# Patient Record
Sex: FEMALE | Race: WHITE | Employment: UNEMPLOYED
[De-identification: names, ages, dates, MRNs, and addresses within clinical notes are randomized per-mention and may not be internally consistent; named-entity substitution may affect disease eponyms.]

---

## 2017-01-03 ENCOUNTER — OFFICE VISIT (OUTPATIENT)
Dept: NEUROSURGERY | Facility: CLINIC | Age: 62
End: 2017-01-03

## 2017-01-03 VITALS
WEIGHT: 155 LBS | HEART RATE: 101 BPM | SYSTOLIC BLOOD PRESSURE: 202 MMHG | HEIGHT: 63 IN | DIASTOLIC BLOOD PRESSURE: 117 MMHG | BODY MASS INDEX: 27.46 KG/M2

## 2017-01-03 DIAGNOSIS — G91.2 NORMAL PRESSURE HYDROCEPHALUS (HCC): Primary | ICD-10-CM

## 2017-01-03 PROCEDURE — 99024 POSTOP FOLLOW-UP VISIT: CPT | Performed by: NEUROLOGICAL SURGERY

## 2017-01-03 RX ORDER — OXYCODONE HYDROCHLORIDE AND ACETAMINOPHEN 5; 325 MG/1; MG/1
1 TABLET ORAL EVERY 8 HOURS PRN
Qty: 90 TABLET | Refills: 0 | Status: SHIPPED | OUTPATIENT
Start: 2017-01-20 | End: 2017-01-27

## 2018-07-02 ENCOUNTER — HOSPITAL ENCOUNTER (EMERGENCY)
Age: 63
Discharge: HOME | End: 2018-07-02
Attending: EMERGENCY MEDICINE
Payer: MEDICARE

## 2018-07-02 VITALS
SYSTOLIC BLOOD PRESSURE: 146 MMHG | BODY MASS INDEX: 21.79 KG/M2 | HEART RATE: 78 BPM | TEMPERATURE: 98.4 F | DIASTOLIC BLOOD PRESSURE: 74 MMHG | WEIGHT: 123 LBS | OXYGEN SATURATION: 98 % | RESPIRATION RATE: 20 BRPM

## 2018-07-02 DIAGNOSIS — L02.11 NECK ABSCESS: Primary | ICD-10-CM

## 2018-07-02 PROCEDURE — 86403 PARTICLE AGGLUT ANTBDY SCRN: CPT

## 2018-07-02 PROCEDURE — 87205 SMEAR GRAM STAIN: CPT

## 2018-07-02 PROCEDURE — 87070 CULTURE OTHR SPECIMN AEROBIC: CPT

## 2018-07-02 PROCEDURE — 6370000000 HC RX 637 (ALT 250 FOR IP): Performed by: EMERGENCY MEDICINE

## 2018-07-02 PROCEDURE — 87186 SC STD MICRODIL/AGAR DIL: CPT

## 2018-07-02 PROCEDURE — 99283 EMERGENCY DEPT VISIT LOW MDM: CPT

## 2018-07-02 RX ORDER — CLINDAMYCIN HYDROCHLORIDE 150 MG/1
300 CAPSULE ORAL ONCE
Status: COMPLETED | OUTPATIENT
Start: 2018-07-02 | End: 2018-07-02

## 2018-07-02 RX ORDER — CLINDAMYCIN HYDROCHLORIDE 300 MG/1
300 CAPSULE ORAL 3 TIMES DAILY
Qty: 30 CAPSULE | Refills: 0 | Status: SHIPPED | OUTPATIENT
Start: 2018-07-02 | End: 2018-07-12

## 2018-07-02 RX ADMIN — CLINDAMYCIN HYDROCHLORIDE 300 MG: 150 CAPSULE ORAL at 02:03

## 2018-07-02 NOTE — ED NOTES
patient to ED with a lump behind her right ear that family noticed on 6-29-18, and today they noticed the lump has started leaking     Lucy Guzman RN  07/02/18 4064

## 2018-07-02 NOTE — ED PROVIDER NOTES
fluctuance. Eyes:  PERRL, EOMI, Conjunctiva normal, No discharge. Respiratory:  Normal breath sounds, No respiratory distress, No wheezing, No chest tenderness. Cardiovascular:  Normal heart rate, Normal rhythm, No murmurs, No rubs, No gallops. GI:  Bowel sounds normal, Soft, No tenderness, No masses, No pulsatile masses. : External genitalia appear normal, No masses or lesions. No discharge. No CVA tenderness. Musculoskeletal:  Intact distal pulses, No edema, No tenderness, No cyanosis, No clubbing. Good range of motion in all major joints. No tenderness to palpation or major deformities noted. Back- No tenderness. Integument:  Warm, Dry, No erythema, No rash. Lymphatic:  No lymphadenopathy noted. Neurologic:  Alert & oriented x 3, Normal motor function, Normal sensory function, No focal deficits noted. Psychiatric:  Affect normal, Judgment normal, Mood normal.     EKG        RADIOLOGY    No orders to display       PROCEDURES        Labs  Labs Reviewed   WOUND CULTURE             Summation      Patient Course: Cultures obtained in ED. Patient is sent home on clindamycin. Surgical referral for incision and drainage is recommended. The warning signs were discussed. Warm compress daily is recommended. ED Medications administered this visit:    Medications   clindamycin (CLEOCIN) capsule 300 mg (300 mg Oral Given 7/2/18 0203)       New Prescriptions from this visit:    Discharge Medication List as of 7/2/2018  1:48 AM      START taking these medications    Details   clindamycin (CLEOCIN) 300 MG capsule Take 1 capsule by mouth 3 times daily for 10 days, Disp-30 capsule, R-0Print             Follow-up:  Anastasia Nunez MD  [facility]  [phone]    Schedule an appointment as soon as possible for a visit           Final Impression:   1.  Neck abscess               (Please note that portions of this note were completed with a voice recognition program. Efforts were made to edit the dictations but occasionally words are mis-transcribed.)        Mark Esquivel MD  07/02/18 0947

## 2018-07-04 LAB
CULTURE: ABNORMAL
DIRECT EXAM: ABNORMAL
DIRECT EXAM: ABNORMAL
Lab: ABNORMAL
ORGANISM: ABNORMAL
SPECIMEN DESCRIPTION: ABNORMAL
STATUS: ABNORMAL

## 2019-12-04 ENCOUNTER — APPOINTMENT (OUTPATIENT)
Dept: GENERAL RADIOLOGY | Age: 64
DRG: 536 | End: 2019-12-04
Payer: MEDICARE

## 2019-12-04 ENCOUNTER — HOSPITAL ENCOUNTER (INPATIENT)
Age: 64
LOS: 1 days | Discharge: ANOTHER HOSPITAL | DRG: 536 | End: 2019-12-05
Attending: EMERGENCY MEDICINE | Admitting: INTERNAL MEDICINE
Payer: MEDICARE

## 2019-12-04 DIAGNOSIS — S32.591A PUBIC RAMUS FRACTURE, RIGHT, CLOSED, INITIAL ENCOUNTER (HCC): Primary | ICD-10-CM

## 2019-12-04 DIAGNOSIS — S42.211A FX HUMERAL NECK, RIGHT, CLOSED, INITIAL ENCOUNTER: ICD-10-CM

## 2019-12-04 DIAGNOSIS — M25.551 RIGHT HIP PAIN: ICD-10-CM
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
## 2019-12-04 LAB
-: ABNORMAL
ABSOLUTE EOS #: ABNORMAL K/UL (ref 0–0.4)
ABSOLUTE IMMATURE GRANULOCYTE: ABNORMAL K/UL (ref 0–0.3)
ABSOLUTE LYMPH #: 0.77 K/UL (ref 1–4.8)
ABSOLUTE MONO #: 0.97 K/UL (ref 0–1)
ALBUMIN SERPL-MCNC: 4.2 G/DL (ref 3.5–5.2)
ALBUMIN/GLOBULIN RATIO: ABNORMAL (ref 1–2.5)
ALP BLD-CCNC: 127 U/L (ref 35–104)
ALT SERPL-CCNC: 14 U/L (ref 5–33)
AMORPHOUS: ABNORMAL
ANION GAP SERPL CALCULATED.3IONS-SCNC: 16 MMOL/L (ref 9–17)
AST SERPL-CCNC: 22 U/L
BACTERIA: ABNORMAL
BASOPHILS # BLD: ABNORMAL % (ref 0–2)
BASOPHILS ABSOLUTE: ABNORMAL K/UL (ref 0–0.2)
BILIRUB SERPL-MCNC: 0.76 MG/DL (ref 0.3–1.2)
BILIRUBIN URINE: NEGATIVE
BUN BLDV-MCNC: 15 MG/DL (ref 8–23)
BUN/CREAT BLD: 18 (ref 9–20)
CALCIUM SERPL-MCNC: 9.6 MG/DL (ref 8.6–10.4)
CASTS UA: ABNORMAL /LPF
CHLORIDE BLD-SCNC: 99 MMOL/L (ref 98–107)
CO2: 21 MMOL/L (ref 20–31)
COLOR: YELLOW
COMMENT UA: ABNORMAL
CREAT SERPL-MCNC: 0.85 MG/DL (ref 0.5–0.9)
CRYSTALS, UA: ABNORMAL /HPF
DIFFERENTIAL TYPE: ABNORMAL
EOSINOPHILS RELATIVE PERCENT: ABNORMAL % (ref 0–5)
EPITHELIAL CELLS UA: ABNORMAL /HPF
GFR AFRICAN AMERICAN: >60 ML/MIN
GFR NON-AFRICAN AMERICAN: >60 ML/MIN
GFR SERPL CREATININE-BSD FRML MDRD: ABNORMAL ML/MIN/{1.73_M2}
GFR SERPL CREATININE-BSD FRML MDRD: ABNORMAL ML/MIN/{1.73_M2}
GLUCOSE BLD-MCNC: 199 MG/DL (ref 70–99)
GLUCOSE URINE: NEGATIVE
HCT VFR BLD CALC: 41.5 % (ref 36–46)
HEMOGLOBIN: 13.8 G/DL (ref 12–16)
IMMATURE GRANULOCYTES: ABNORMAL %
INR BLD: 1.1
KETONES, URINE: NEGATIVE
LEUKOCYTE ESTERASE, URINE: ABNORMAL
LYMPHOCYTES # BLD: 4 % (ref 15–40)
MCH RBC QN AUTO: 29.3 PG (ref 26–34)
MCHC RBC AUTO-ENTMCNC: 33.2 G/DL (ref 31–37)
MCV RBC AUTO: 88.3 FL (ref 80–100)
MONOCYTES # BLD: 5 % (ref 4–8)
MORPHOLOGY: ABNORMAL
MUCUS: ABNORMAL
NITRITE, URINE: POSITIVE
NRBC AUTOMATED: ABNORMAL PER 100 WBC
OTHER OBSERVATIONS UA: ABNORMAL
PDW BLD-RTO: 13.1 % (ref 12.1–15.2)
PH UA: 6 (ref 5–8)
PLATELET # BLD: 233 K/UL (ref 140–450)
PLATELET ESTIMATE: ABNORMAL
PMV BLD AUTO: ABNORMAL FL (ref 6–12)
POTASSIUM SERPL-SCNC: 4.2 MMOL/L (ref 3.7–5.3)
PROTEIN UA: ABNORMAL
PROTHROMBIN TIME: 10.5 SEC (ref 9–11.6)
RBC # BLD: 4.7 M/UL (ref 4–5.2)
RBC # BLD: ABNORMAL 10*6/UL
RBC UA: ABNORMAL /HPF (ref 0–2)
RENAL EPITHELIAL, UA: ABNORMAL /HPF
SEG NEUTROPHILS: 91 % (ref 47–75)
SEGMENTED NEUTROPHILS ABSOLUTE COUNT: 17.56 K/UL (ref 2.5–7)
SODIUM BLD-SCNC: 136 MMOL/L (ref 135–144)
SPECIFIC GRAVITY UA: 1.01 (ref 1–1.03)
TOTAL PROTEIN: 8.6 G/DL (ref 6.4–8.3)
TRICHOMONAS: ABNORMAL
TROPONIN INTERP: NORMAL
TROPONIN T: <0.03 NG/ML
TROPONIN, HIGH SENSITIVITY: NORMAL NG/L (ref 0–14)
TURBIDITY: ABNORMAL
URINE HGB: ABNORMAL
UROBILINOGEN, URINE: NORMAL
WBC # BLD: 19.3 K/UL (ref 3.5–11)
WBC # BLD: ABNORMAL 10*3/UL
WBC UA: ABNORMAL /HPF
YEAST: ABNORMAL

## 2019-12-04 PROCEDURE — 84484 ASSAY OF TROPONIN QUANT: CPT

## 2019-12-04 PROCEDURE — 1200000000 HC SEMI PRIVATE

## 2019-12-04 PROCEDURE — 2580000003 HC RX 258: Performed by: INTERNAL MEDICINE

## 2019-12-04 PROCEDURE — 99285 EMERGENCY DEPT VISIT HI MDM: CPT

## 2019-12-04 PROCEDURE — 87186 SC STD MICRODIL/AGAR DIL: CPT

## 2019-12-04 PROCEDURE — 6370000000 HC RX 637 (ALT 250 FOR IP): Performed by: INTERNAL MEDICINE

## 2019-12-04 PROCEDURE — 73030 X-RAY EXAM OF SHOULDER: CPT

## 2019-12-04 PROCEDURE — 81001 URINALYSIS AUTO W/SCOPE: CPT

## 2019-12-04 PROCEDURE — 73502 X-RAY EXAM HIP UNI 2-3 VIEWS: CPT

## 2019-12-04 PROCEDURE — 96375 TX/PRO/DX INJ NEW DRUG ADDON: CPT

## 2019-12-04 PROCEDURE — 93005 ELECTROCARDIOGRAM TRACING: CPT | Performed by: EMERGENCY MEDICINE

## 2019-12-04 PROCEDURE — 6360000002 HC RX W HCPCS: Performed by: EMERGENCY MEDICINE

## 2019-12-04 PROCEDURE — 94761 N-INVAS EAR/PLS OXIMETRY MLT: CPT

## 2019-12-04 PROCEDURE — 96374 THER/PROPH/DIAG INJ IV PUSH: CPT

## 2019-12-04 PROCEDURE — 6360000002 HC RX W HCPCS: Performed by: INTERNAL MEDICINE

## 2019-12-04 PROCEDURE — 85610 PROTHROMBIN TIME: CPT

## 2019-12-04 PROCEDURE — 87088 URINE BACTERIA CULTURE: CPT

## 2019-12-04 PROCEDURE — 73564 X-RAY EXAM KNEE 4 OR MORE: CPT

## 2019-12-04 PROCEDURE — 80053 COMPREHEN METABOLIC PANEL: CPT

## 2019-12-04 PROCEDURE — 96376 TX/PRO/DX INJ SAME DRUG ADON: CPT

## 2019-12-04 PROCEDURE — 85025 COMPLETE CBC W/AUTO DIFF WBC: CPT

## 2019-12-04 PROCEDURE — 87086 URINE CULTURE/COLONY COUNT: CPT

## 2019-12-04 RX ORDER — ONDANSETRON 2 MG/ML
4 INJECTION INTRAMUSCULAR; INTRAVENOUS ONCE
Status: COMPLETED | OUTPATIENT
Start: 2019-12-04 | End: 2019-12-04

## 2019-12-04 RX ORDER — POLYETHYLENE GLYCOL 3350 17 G/17G
17 POWDER, FOR SOLUTION ORAL DAILY PRN
Status: DISCONTINUED | OUTPATIENT
Start: 2019-12-04 | End: 2019-12-06 | Stop reason: HOSPADM

## 2019-12-04 RX ORDER — MORPHINE SULFATE 2 MG/ML
2 INJECTION, SOLUTION INTRAMUSCULAR; INTRAVENOUS
Status: DISCONTINUED | OUTPATIENT
Start: 2019-12-04 | End: 2019-12-05

## 2019-12-04 RX ORDER — ONDANSETRON 2 MG/ML
4 INJECTION INTRAMUSCULAR; INTRAVENOUS EVERY 6 HOURS PRN
Status: DISCONTINUED | OUTPATIENT
Start: 2019-12-04 | End: 2019-12-06 | Stop reason: HOSPADM

## 2019-12-04 RX ORDER — TRAMADOL HYDROCHLORIDE 50 MG/1
50 TABLET ORAL EVERY 6 HOURS PRN
Status: DISCONTINUED | OUTPATIENT
Start: 2019-12-04 | End: 2019-12-06 | Stop reason: HOSPADM

## 2019-12-04 RX ORDER — SODIUM CHLORIDE 0.9 % (FLUSH) 0.9 %
10 SYRINGE (ML) INJECTION EVERY 12 HOURS SCHEDULED
Status: DISCONTINUED | OUTPATIENT
Start: 2019-12-04 | End: 2019-12-06 | Stop reason: HOSPADM

## 2019-12-04 RX ORDER — SODIUM CHLORIDE 0.9 % (FLUSH) 0.9 %
10 SYRINGE (ML) INJECTION PRN
Status: DISCONTINUED | OUTPATIENT
Start: 2019-12-04 | End: 2019-12-06 | Stop reason: HOSPADM

## 2019-12-04 RX ORDER — SODIUM CHLORIDE 9 MG/ML
INJECTION, SOLUTION INTRAVENOUS CONTINUOUS
Status: DISCONTINUED | OUTPATIENT
Start: 2019-12-04 | End: 2019-12-06 | Stop reason: HOSPADM

## 2019-12-04 RX ORDER — CIPROFLOXACIN 500 MG/1
500 TABLET, FILM COATED ORAL EVERY 12 HOURS SCHEDULED
Status: DISCONTINUED | OUTPATIENT
Start: 2019-12-04 | End: 2019-12-06 | Stop reason: HOSPADM

## 2019-12-04 RX ORDER — HYDRALAZINE HYDROCHLORIDE 20 MG/ML
10 INJECTION INTRAMUSCULAR; INTRAVENOUS EVERY 6 HOURS PRN
Status: DISCONTINUED | OUTPATIENT
Start: 2019-12-04 | End: 2019-12-06 | Stop reason: HOSPADM

## 2019-12-04 RX ORDER — FENTANYL CITRATE 50 UG/ML
50 INJECTION, SOLUTION INTRAMUSCULAR; INTRAVENOUS ONCE
Status: COMPLETED | OUTPATIENT
Start: 2019-12-04 | End: 2019-12-04

## 2019-12-04 RX ORDER — CLONIDINE HYDROCHLORIDE 0.1 MG/1
0.2 TABLET ORAL 3 TIMES DAILY
Status: DISCONTINUED | OUTPATIENT
Start: 2019-12-04 | End: 2019-12-05

## 2019-12-04 RX ORDER — SIMVASTATIN 20 MG
20 TABLET ORAL NIGHTLY
Status: DISCONTINUED | OUTPATIENT
Start: 2019-12-04 | End: 2019-12-06 | Stop reason: HOSPADM

## 2019-12-04 RX ORDER — LISINOPRIL 20 MG/1
40 TABLET ORAL DAILY
Status: DISCONTINUED | OUTPATIENT
Start: 2019-12-04 | End: 2019-12-06 | Stop reason: HOSPADM

## 2019-12-04 RX ORDER — ACETAMINOPHEN 325 MG/1
650 TABLET ORAL EVERY 4 HOURS PRN
Status: DISCONTINUED | OUTPATIENT
Start: 2019-12-04 | End: 2019-12-06 | Stop reason: HOSPADM

## 2019-12-04 RX ADMIN — Medication 10 ML: at 20:42

## 2019-12-04 RX ADMIN — FENTANYL CITRATE 50 MCG: 50 INJECTION INTRAMUSCULAR; INTRAVENOUS at 13:08

## 2019-12-04 RX ADMIN — FENTANYL CITRATE 50 MCG: 50 INJECTION INTRAMUSCULAR; INTRAVENOUS at 14:38

## 2019-12-04 RX ADMIN — LISINOPRIL 40 MG: 20 TABLET ORAL at 17:37

## 2019-12-04 RX ADMIN — SODIUM CHLORIDE: 9 INJECTION, SOLUTION INTRAVENOUS at 23:12

## 2019-12-04 RX ADMIN — CLONIDINE HYDROCHLORIDE 0.2 MG: 0.1 TABLET ORAL at 20:41

## 2019-12-04 RX ADMIN — SIMVASTATIN 20 MG: 20 TABLET, FILM COATED ORAL at 20:41

## 2019-12-04 RX ADMIN — CLONIDINE HYDROCHLORIDE 0.2 MG: 0.1 TABLET ORAL at 17:38

## 2019-12-04 RX ADMIN — ENOXAPARIN SODIUM 40 MG: 40 INJECTION SUBCUTANEOUS at 17:38

## 2019-12-04 RX ADMIN — TRAMADOL HYDROCHLORIDE 50 MG: 50 TABLET, FILM COATED ORAL at 19:05

## 2019-12-04 RX ADMIN — MORPHINE SULFATE 2 MG: 2 INJECTION, SOLUTION INTRAMUSCULAR; INTRAVENOUS at 21:15

## 2019-12-04 RX ADMIN — CIPROFLOXACIN HYDROCHLORIDE 500 MG: 500 TABLET, FILM COATED ORAL at 20:41

## 2019-12-04 RX ADMIN — ONDANSETRON 4 MG: 2 INJECTION, SOLUTION INTRAMUSCULAR; INTRAVENOUS at 13:26

## 2019-12-04 ASSESSMENT — PAIN SCALES - GENERAL
PAINLEVEL_OUTOF10: 0
PAINLEVEL_OUTOF10: 0
PAINLEVEL_OUTOF10: 8
PAINLEVEL_OUTOF10: 8
PAINLEVEL_OUTOF10: 10
PAINLEVEL_OUTOF10: 8
PAINLEVEL_OUTOF10: 9
PAINLEVEL_OUTOF10: 7
PAINLEVEL_OUTOF10: 0
PAINLEVEL_OUTOF10: 6
PAINLEVEL_OUTOF10: 7
PAINLEVEL_OUTOF10: 9

## 2019-12-04 ASSESSMENT — PAIN DESCRIPTION - PAIN TYPE
TYPE: ACUTE PAIN
TYPE: ACUTE PAIN

## 2019-12-04 ASSESSMENT — PAIN DESCRIPTION - DESCRIPTORS: DESCRIPTORS: ACHING

## 2019-12-04 ASSESSMENT — PAIN DESCRIPTION - LOCATION
LOCATION: HIP
LOCATION: KNEE;SHOULDER

## 2019-12-04 ASSESSMENT — PAIN DESCRIPTION - ORIENTATION
ORIENTATION: RIGHT
ORIENTATION: RIGHT

## 2019-12-04 ASSESSMENT — PAIN DESCRIPTION - PROGRESSION: CLINICAL_PROGRESSION: NOT CHANGED

## 2019-12-04 ASSESSMENT — PAIN DESCRIPTION - FREQUENCY
FREQUENCY: CONTINUOUS
FREQUENCY: INTERMITTENT

## 2019-12-04 ASSESSMENT — PAIN DESCRIPTION - ONSET: ONSET: ON-GOING

## 2019-12-05 VITALS
DIASTOLIC BLOOD PRESSURE: 60 MMHG | TEMPERATURE: 97.8 F | HEART RATE: 70 BPM | BODY MASS INDEX: 25.96 KG/M2 | SYSTOLIC BLOOD PRESSURE: 118 MMHG | OXYGEN SATURATION: 92 % | HEIGHT: 63 IN | RESPIRATION RATE: 18 BRPM | WEIGHT: 146.5 LBS

## 2019-12-05 LAB
ABSOLUTE EOS #: 0.1 K/UL (ref 0–0.4)
ABSOLUTE IMMATURE GRANULOCYTE: ABNORMAL K/UL (ref 0–0.3)
ABSOLUTE LYMPH #: 2.2 K/UL (ref 1–4.8)
ABSOLUTE MONO #: 0.8 K/UL (ref 0–1)
BASOPHILS # BLD: 1 % (ref 0–2)
BASOPHILS ABSOLUTE: 0.1 K/UL (ref 0–0.2)
DIFFERENTIAL TYPE: YES
EKG ATRIAL RATE: 98 BPM
EKG P AXIS: 43 DEGREES
EKG P-R INTERVAL: 164 MS
EKG Q-T INTERVAL: 370 MS
EKG QRS DURATION: 86 MS
EKG QTC CALCULATION (BAZETT): 472 MS
EKG R AXIS: 28 DEGREES
EKG T AXIS: 2 DEGREES
EKG VENTRICULAR RATE: 98 BPM
EOSINOPHILS RELATIVE PERCENT: 1 % (ref 0–5)
HCT VFR BLD CALC: 31 % (ref 36–46)
HEMOGLOBIN: 10.4 G/DL (ref 12–16)
IMMATURE GRANULOCYTES: ABNORMAL %
LYMPHOCYTES # BLD: 19 % (ref 15–40)
MCH RBC QN AUTO: 29.7 PG (ref 26–34)
MCHC RBC AUTO-ENTMCNC: 33.4 G/DL (ref 31–37)
MCV RBC AUTO: 88.8 FL (ref 80–100)
MONOCYTES # BLD: 7 % (ref 4–8)
NRBC AUTOMATED: ABNORMAL PER 100 WBC
PDW BLD-RTO: 13.3 % (ref 12.1–15.2)
PLATELET # BLD: 213 K/UL (ref 140–450)
PLATELET ESTIMATE: ABNORMAL
PMV BLD AUTO: ABNORMAL FL (ref 6–12)
RBC # BLD: 3.49 M/UL (ref 4–5.2)
RBC # BLD: ABNORMAL 10*6/UL
SEG NEUTROPHILS: 72 % (ref 47–75)
SEGMENTED NEUTROPHILS ABSOLUTE COUNT: 8.4 K/UL (ref 2.5–7)
WBC # BLD: 11.6 K/UL (ref 3.5–11)
WBC # BLD: ABNORMAL 10*3/UL

## 2019-12-05 PROCEDURE — 6360000002 HC RX W HCPCS: Performed by: INTERNAL MEDICINE

## 2019-12-05 PROCEDURE — 94761 N-INVAS EAR/PLS OXIMETRY MLT: CPT

## 2019-12-05 PROCEDURE — 36415 COLL VENOUS BLD VENIPUNCTURE: CPT

## 2019-12-05 PROCEDURE — 6370000000 HC RX 637 (ALT 250 FOR IP): Performed by: INTERNAL MEDICINE

## 2019-12-05 PROCEDURE — 85025 COMPLETE CBC W/AUTO DIFF WBC: CPT

## 2019-12-05 PROCEDURE — 93010 ELECTROCARDIOGRAM REPORT: CPT | Performed by: INTERNAL MEDICINE

## 2019-12-05 PROCEDURE — 51798 US URINE CAPACITY MEASURE: CPT

## 2019-12-05 PROCEDURE — 2580000003 HC RX 258: Performed by: INTERNAL MEDICINE

## 2019-12-05 RX ORDER — NICOTINE 21 MG/24HR
1 PATCH, TRANSDERMAL 24 HOURS TRANSDERMAL DAILY
Status: DISCONTINUED | OUTPATIENT
Start: 2019-12-05 | End: 2019-12-06 | Stop reason: HOSPADM

## 2019-12-05 RX ORDER — MORPHINE SULFATE 2 MG/ML
1 INJECTION, SOLUTION INTRAMUSCULAR; INTRAVENOUS
Status: DISCONTINUED | OUTPATIENT
Start: 2019-12-05 | End: 2019-12-06 | Stop reason: HOSPADM

## 2019-12-05 RX ORDER — CLONIDINE HYDROCHLORIDE 0.1 MG/1
0.1 TABLET ORAL 3 TIMES DAILY
Status: DISCONTINUED | OUTPATIENT
Start: 2019-12-05 | End: 2019-12-06 | Stop reason: HOSPADM

## 2019-12-05 RX ORDER — LIDOCAINE 4 G/G
1 PATCH TOPICAL DAILY
Status: DISCONTINUED | OUTPATIENT
Start: 2019-12-05 | End: 2019-12-06 | Stop reason: HOSPADM

## 2019-12-05 RX ADMIN — CIPROFLOXACIN HYDROCHLORIDE 500 MG: 500 TABLET, FILM COATED ORAL at 09:25

## 2019-12-05 RX ADMIN — SODIUM CHLORIDE: 9 INJECTION, SOLUTION INTRAVENOUS at 18:37

## 2019-12-05 RX ADMIN — ENOXAPARIN SODIUM 40 MG: 40 INJECTION SUBCUTANEOUS at 09:25

## 2019-12-05 RX ADMIN — SIMVASTATIN 20 MG: 20 TABLET, FILM COATED ORAL at 21:44

## 2019-12-05 RX ADMIN — MORPHINE SULFATE 2 MG: 2 INJECTION, SOLUTION INTRAMUSCULAR; INTRAVENOUS at 15:36

## 2019-12-05 RX ADMIN — MORPHINE SULFATE 2 MG: 2 INJECTION, SOLUTION INTRAMUSCULAR; INTRAVENOUS at 07:25

## 2019-12-05 RX ADMIN — ACETAMINOPHEN 650 MG: 325 TABLET, FILM COATED ORAL at 18:10

## 2019-12-05 RX ADMIN — CLONIDINE HYDROCHLORIDE 0.1 MG: 0.1 TABLET ORAL at 15:36

## 2019-12-05 RX ADMIN — CIPROFLOXACIN HYDROCHLORIDE 500 MG: 500 TABLET, FILM COATED ORAL at 21:43

## 2019-12-05 RX ADMIN — CLONIDINE HYDROCHLORIDE 0.1 MG: 0.1 TABLET ORAL at 21:44

## 2019-12-05 RX ADMIN — MORPHINE SULFATE 1 MG: 2 INJECTION, SOLUTION INTRAMUSCULAR; INTRAVENOUS at 21:44

## 2019-12-05 RX ADMIN — LISINOPRIL 40 MG: 20 TABLET ORAL at 09:25

## 2019-12-05 RX ADMIN — TRAMADOL HYDROCHLORIDE 50 MG: 50 TABLET, FILM COATED ORAL at 12:49

## 2019-12-05 RX ADMIN — MORPHINE SULFATE 1 MG: 2 INJECTION, SOLUTION INTRAMUSCULAR; INTRAVENOUS at 18:37

## 2019-12-05 RX ADMIN — Medication 10 ML: at 07:26

## 2019-12-05 RX ADMIN — TRAMADOL HYDROCHLORIDE 50 MG: 50 TABLET, FILM COATED ORAL at 06:49

## 2019-12-05 RX ADMIN — MORPHINE SULFATE 2 MG: 2 INJECTION, SOLUTION INTRAMUSCULAR; INTRAVENOUS at 02:59

## 2019-12-05 RX ADMIN — CLONIDINE HYDROCHLORIDE 0.1 MG: 0.1 TABLET ORAL at 09:25

## 2019-12-05 ASSESSMENT — PAIN SCALES - GENERAL
PAINLEVEL_OUTOF10: 8
PAINLEVEL_OUTOF10: 0
PAINLEVEL_OUTOF10: 0
PAINLEVEL_OUTOF10: 8
PAINLEVEL_OUTOF10: 0
PAINLEVEL_OUTOF10: 8
PAINLEVEL_OUTOF10: 7
PAINLEVEL_OUTOF10: 0
PAINLEVEL_OUTOF10: 8
PAINLEVEL_OUTOF10: 2
PAINLEVEL_OUTOF10: 6
PAINLEVEL_OUTOF10: 0
PAINLEVEL_OUTOF10: 8
PAINLEVEL_OUTOF10: 8

## 2019-12-05 ASSESSMENT — PAIN DESCRIPTION - ONSET: ONSET: ON-GOING

## 2019-12-05 ASSESSMENT — PAIN DESCRIPTION - LOCATION: LOCATION: OTHER (COMMENT)

## 2019-12-05 ASSESSMENT — PAIN DESCRIPTION - FREQUENCY
FREQUENCY: CONTINUOUS
FREQUENCY: CONTINUOUS

## 2019-12-05 ASSESSMENT — PAIN DESCRIPTION - PAIN TYPE
TYPE: ACUTE PAIN
TYPE: ACUTE PAIN

## 2019-12-06 LAB
CULTURE: ABNORMAL
Lab: ABNORMAL
SPECIMEN DESCRIPTION: ABNORMAL

## 2020-01-23 ENCOUNTER — HOSPITAL ENCOUNTER (OUTPATIENT)
Dept: GENERAL RADIOLOGY | Age: 65
Discharge: HOME | End: 2020-01-25
Payer: MEDICARE

## 2020-01-23 ENCOUNTER — HOSPITAL ENCOUNTER (OUTPATIENT)
Age: 65
Discharge: HOME | End: 2020-01-25
Payer: MEDICARE

## 2020-01-23 PROCEDURE — 73564 X-RAY EXAM KNEE 4 OR MORE: CPT

## 2020-01-23 PROCEDURE — 73060 X-RAY EXAM OF HUMERUS: CPT

## 2020-01-23 PROCEDURE — 72170 X-RAY EXAM OF PELVIS: CPT

## 2020-09-24 ENCOUNTER — APPOINTMENT (OUTPATIENT)
Dept: GENERAL RADIOLOGY | Age: 65
End: 2020-09-24
Payer: MEDICARE

## 2020-09-24 ENCOUNTER — HOSPITAL ENCOUNTER (EMERGENCY)
Age: 65
Discharge: ANOTHER HOSPITAL | End: 2020-09-24
Attending: INTERNAL MEDICINE
Payer: MEDICARE

## 2020-09-24 VITALS
RESPIRATION RATE: 15 BRPM | WEIGHT: 146 LBS | BODY MASS INDEX: 25.86 KG/M2 | DIASTOLIC BLOOD PRESSURE: 100 MMHG | OXYGEN SATURATION: 100 % | TEMPERATURE: 97.5 F | SYSTOLIC BLOOD PRESSURE: 142 MMHG | HEART RATE: 105 BPM

## 2020-09-24 LAB
ABSOLUTE EOS #: 0.2 K/UL (ref 0–0.4)
ABSOLUTE IMMATURE GRANULOCYTE: ABNORMAL K/UL (ref 0–0.3)
ABSOLUTE LYMPH #: 2.2 K/UL (ref 1–4.8)
ABSOLUTE MONO #: 0.6 K/UL (ref 0–1)
ALBUMIN SERPL-MCNC: 4.2 G/DL (ref 3.5–5.2)
ALBUMIN/GLOBULIN RATIO: ABNORMAL (ref 1–2.5)
ALP BLD-CCNC: 126 U/L (ref 35–104)
ALT SERPL-CCNC: 14 U/L (ref 5–33)
ANION GAP SERPL CALCULATED.3IONS-SCNC: 15 MMOL/L (ref 9–17)
AST SERPL-CCNC: 20 U/L
BASOPHILS # BLD: 0 % (ref 0–2)
BASOPHILS ABSOLUTE: 0 K/UL (ref 0–0.2)
BILIRUB SERPL-MCNC: 0.7 MG/DL (ref 0.3–1.2)
BNP INTERPRETATION: ABNORMAL
BUN BLDV-MCNC: 19 MG/DL (ref 8–23)
BUN/CREAT BLD: 17 (ref 9–20)
CALCIUM SERPL-MCNC: 8.7 MG/DL (ref 8.6–10.4)
CHLORIDE BLD-SCNC: 107 MMOL/L (ref 98–107)
CO2: 21 MMOL/L (ref 20–31)
CREAT SERPL-MCNC: 1.13 MG/DL (ref 0.5–0.9)
DIFFERENTIAL TYPE: YES
EKG ATRIAL RATE: 170 BPM
EKG Q-T INTERVAL: 320 MS
EKG QRS DURATION: 84 MS
EKG QTC CALCULATION (BAZETT): 441 MS
EKG R AXIS: 42 DEGREES
EKG T AXIS: -144 DEGREES
EKG VENTRICULAR RATE: 114 BPM
EOSINOPHILS RELATIVE PERCENT: 2 % (ref 0–5)
GFR AFRICAN AMERICAN: 58 ML/MIN
GFR NON-AFRICAN AMERICAN: 48 ML/MIN
GFR SERPL CREATININE-BSD FRML MDRD: ABNORMAL ML/MIN/{1.73_M2}
GFR SERPL CREATININE-BSD FRML MDRD: ABNORMAL ML/MIN/{1.73_M2}
GLUCOSE BLD-MCNC: 192 MG/DL (ref 70–99)
HCT VFR BLD CALC: 41.8 % (ref 36–46)
HEMOGLOBIN: 13.5 G/DL (ref 12–16)
IMMATURE GRANULOCYTES: ABNORMAL %
INR BLD: 1.3
LYMPHOCYTES # BLD: 21 % (ref 15–40)
MAGNESIUM: 2.1 MG/DL (ref 1.6–2.6)
MCH RBC QN AUTO: 28.1 PG (ref 26–34)
MCHC RBC AUTO-ENTMCNC: 32.2 G/DL (ref 31–37)
MCV RBC AUTO: 87.1 FL (ref 80–100)
MONOCYTES # BLD: 6 % (ref 4–8)
NRBC AUTOMATED: ABNORMAL PER 100 WBC
PARTIAL THROMBOPLASTIN TIME: 27.2 SEC (ref 23.9–33.8)
PDW BLD-RTO: 15.7 % (ref 12.1–15.2)
PLATELET # BLD: 170 K/UL (ref 140–450)
PLATELET ESTIMATE: ABNORMAL
PMV BLD AUTO: ABNORMAL FL (ref 6–12)
POTASSIUM SERPL-SCNC: 3.2 MMOL/L (ref 3.7–5.3)
PRO-BNP: ABNORMAL PG/ML
PROTHROMBIN TIME: 16 SEC (ref 11.5–14.2)
RBC # BLD: 4.8 M/UL (ref 4–5.2)
RBC # BLD: ABNORMAL 10*6/UL
SEG NEUTROPHILS: 71 % (ref 47–75)
SEGMENTED NEUTROPHILS ABSOLUTE COUNT: 7.3 K/UL (ref 2.5–7)
SODIUM BLD-SCNC: 143 MMOL/L (ref 135–144)
TOTAL PROTEIN: 8 G/DL (ref 6.4–8.3)
TROPONIN INTERP: NORMAL
TROPONIN T: <0.03 NG/ML
TROPONIN, HIGH SENSITIVITY: NORMAL NG/L (ref 0–14)
WBC # BLD: 10.4 K/UL (ref 3.5–11)
WBC # BLD: ABNORMAL 10*3/UL

## 2020-09-24 PROCEDURE — 96374 THER/PROPH/DIAG INJ IV PUSH: CPT

## 2020-09-24 PROCEDURE — 96375 TX/PRO/DX INJ NEW DRUG ADDON: CPT

## 2020-09-24 PROCEDURE — 93005 ELECTROCARDIOGRAM TRACING: CPT | Performed by: INTERNAL MEDICINE

## 2020-09-24 PROCEDURE — 99285 EMERGENCY DEPT VISIT HI MDM: CPT

## 2020-09-24 PROCEDURE — 85025 COMPLETE CBC W/AUTO DIFF WBC: CPT

## 2020-09-24 PROCEDURE — 80053 COMPREHEN METABOLIC PANEL: CPT

## 2020-09-24 PROCEDURE — C9803 HOPD COVID-19 SPEC COLLECT: HCPCS

## 2020-09-24 PROCEDURE — 84484 ASSAY OF TROPONIN QUANT: CPT

## 2020-09-24 PROCEDURE — 93010 ELECTROCARDIOGRAM REPORT: CPT | Performed by: INTERNAL MEDICINE

## 2020-09-24 PROCEDURE — U0002 COVID-19 LAB TEST NON-CDC: HCPCS

## 2020-09-24 PROCEDURE — 99284 EMERGENCY DEPT VISIT MOD MDM: CPT

## 2020-09-24 PROCEDURE — 71045 X-RAY EXAM CHEST 1 VIEW: CPT

## 2020-09-24 PROCEDURE — 94664 DEMO&/EVAL PT USE INHALER: CPT

## 2020-09-24 PROCEDURE — 6370000000 HC RX 637 (ALT 250 FOR IP): Performed by: INTERNAL MEDICINE

## 2020-09-24 PROCEDURE — 83735 ASSAY OF MAGNESIUM: CPT

## 2020-09-24 PROCEDURE — 85730 THROMBOPLASTIN TIME PARTIAL: CPT

## 2020-09-24 PROCEDURE — 6370000000 HC RX 637 (ALT 250 FOR IP)

## 2020-09-24 PROCEDURE — 85610 PROTHROMBIN TIME: CPT

## 2020-09-24 PROCEDURE — 96361 HYDRATE IV INFUSION ADD-ON: CPT

## 2020-09-24 PROCEDURE — 2500000003 HC RX 250 WO HCPCS: Performed by: INTERNAL MEDICINE

## 2020-09-24 PROCEDURE — 83880 ASSAY OF NATRIURETIC PEPTIDE: CPT

## 2020-09-24 PROCEDURE — 2580000003 HC RX 258: Performed by: INTERNAL MEDICINE

## 2020-09-24 PROCEDURE — 6360000002 HC RX W HCPCS: Performed by: INTERNAL MEDICINE

## 2020-09-24 RX ORDER — POTASSIUM CHLORIDE 750 MG/1
20 TABLET, FILM COATED, EXTENDED RELEASE ORAL
Status: DISCONTINUED | OUTPATIENT
Start: 2020-09-24 | End: 2020-09-24

## 2020-09-24 RX ORDER — DILTIAZEM HYDROCHLORIDE 5 MG/ML
15 INJECTION INTRAVENOUS ONCE
Status: COMPLETED | OUTPATIENT
Start: 2020-09-24 | End: 2020-09-24

## 2020-09-24 RX ORDER — METHYLPREDNISOLONE SODIUM SUCCINATE 125 MG/2ML
125 INJECTION, POWDER, LYOPHILIZED, FOR SOLUTION INTRAMUSCULAR; INTRAVENOUS ONCE
Status: COMPLETED | OUTPATIENT
Start: 2020-09-24 | End: 2020-09-24

## 2020-09-24 RX ORDER — FUROSEMIDE 10 MG/ML
20 INJECTION INTRAMUSCULAR; INTRAVENOUS ONCE
Status: COMPLETED | OUTPATIENT
Start: 2020-09-24 | End: 2020-09-24

## 2020-09-24 RX ORDER — 0.9 % SODIUM CHLORIDE 0.9 %
1000 INTRAVENOUS SOLUTION INTRAVENOUS ONCE
Status: DISCONTINUED | OUTPATIENT
Start: 2020-09-24 | End: 2020-09-24

## 2020-09-24 RX ORDER — ALBUTEROL SULFATE 90 UG/1
2 AEROSOL, METERED RESPIRATORY (INHALATION) ONCE
Status: COMPLETED | OUTPATIENT
Start: 2020-09-24 | End: 2020-09-24

## 2020-09-24 RX ADMIN — POTASSIUM BICARBONATE 20 MEQ: 782 TABLET, EFFERVESCENT ORAL at 04:32

## 2020-09-24 RX ADMIN — DILTIAZEM HYDROCHLORIDE 15 MG: 5 INJECTION INTRAVENOUS at 00:40

## 2020-09-24 RX ADMIN — FUROSEMIDE 20 MG: 10 INJECTION, SOLUTION INTRAMUSCULAR; INTRAVENOUS at 01:19

## 2020-09-24 RX ADMIN — SODIUM CHLORIDE 1000 ML: 9 INJECTION, SOLUTION INTRAVENOUS at 00:41

## 2020-09-24 RX ADMIN — ALBUTEROL SULFATE 2 PUFF: 90 AEROSOL, METERED RESPIRATORY (INHALATION) at 01:58

## 2020-09-24 RX ADMIN — METHYLPREDNISOLONE SODIUM SUCCINATE 125 MG: 125 INJECTION, POWDER, FOR SOLUTION INTRAMUSCULAR; INTRAVENOUS at 01:19

## 2020-09-24 NOTE — ED PROVIDER NOTES
[facility] ED  EMERGENCY DEPARTMENT ENCOUNTER      Pt Name: Francisco Osborn  MRN: [id number]  [place] 1955  Date of evaluation: 9/24/2020  Provider: Claudetta Monas, MD    CHIEF COMPLAINT       Chief Complaint   Patient presents with    Shortness of Breath     Pt C/O SOB x 2 days. HISTORY OF PRESENT ILLNESS   (Location/Symptom, Timing/Onset, Context/Setting, Quality, Duration, Modifying Factors, Severity)  Note limiting factors. Francisco Osborn is a 77 y.o. female who has a history of normal pressure hydrocephalus, hypertension, urinary tract infections, cerebrovascular disease, pelvic fracture, hyperlipidemia, hypertension, TIA, tobacco abuse, presents to the emergency department for evaluation and management of 2 days of shortness of breath. Atrial fibrillation was identified. Patient states that she periodically has arrhythmias and racing heart. She states that she believes it started 2 days ago. This patient is being evaluated during the COVID-19 pandemic. HPI    Nursing Notes were reviewed. REVIEW OF SYSTEMS    (2-9 systems for level 4, 10 or more for level 5)       REVIEW OF SYSTEMS    Constitutional: Negative for fatigue and fever. Respiratory: Positive shortness of breath, negative for cough, chest tightness    Cardiovascular: Positive palpitations, negative for chest pain, leg swelling. Gastrointestinal: Negative for abdominal distention, abdominal pain, diarrhea, nausea and vomiting. Musculoskeletal: Negative for arthralgias, back pain and neck pain. Skin: Negative for color change, pallor, rash and wound. Except as noted above the remainder of the review of systems was reviewed and negative.        PASTMEDICAL HISTORY     Past Medical History:   Diagnosis Date    Cerebral artery occlusion with cerebral infarction ([address] LAST ONE 1990    X 2 NO DEFECITS    Difficult intravenous access     Hydrocephalus ([address] 08/2016    FORGETFUL AND SLEEPING ALOT    Hyperlipidemia 1996    ON RX    Hypertension 1996    ON RX    MRSA (methicillin resistant staph aureus) culture positive 07/02/2018    neck    Smoker     TIA (transient ischemic attack)     Wears glasses          SURGICAL HISTORY       Past Surgical History:   Procedure Laterality Date    KNEE SURGERY Right 2008    KNEE SURGERY Right     OVER 20 YRS AGO    OTHER SURGICAL HISTORY Right 12/14/2016     shunt     TUBAL LIGATION  1984    VENTRICULOPERITONEAL SHUNT Right 12/14/2016         CURRENT MEDICATIONS       Previous Medications    CLONIDINE (CATAPRES) 0.2 MG TABLET    Take 1 tablet by mouth every 4 hours as needed for High Blood Pressure (SBP above 160)    LISINOPRIL (PRINIVIL;ZESTRIL) 40 MG TABLET    Take 1 tablet by mouth daily    SIMVASTATIN (ZOCOR) 20 MG TABLET    Take 1 tablet by mouth nightly       ALLERGIES     Sulfa antibiotics; Adhesive tape; and Pcn [penicillins]    FAMILY HISTORY       Family History   Problem Relation Age of Onset    Diabetes Father         IDDM    High Blood Pressure Sister     Cancer Sister         UNKNOWN    Diabetes Maternal Grandmother         IDDM    High Blood Pressure Maternal Grandmother     High Blood Pressure Sister           SOCIAL HISTORY       Social History     Socioeconomic History    Marital status:       Spouse name: None    Number of children: None    Years of education: None    Highest education level: None   Occupational History    None   Social Needs    Financial resource strain: None    Food insecurity     Worry: None     Inability: None    Transportation needs     Medical: None     Non-medical: None   Tobacco Use    Smoking status: Current Every Day Smoker     Packs/day: 1.00     Years: 40.00     Pack years: 40.00     Types: Cigarettes    Smokeless tobacco: Never Used   Substance and Sexual Activity    Alcohol use: No    Drug use: No    Sexual activity: None   Lifestyle    Physical activity     Days per week: None Minutes per session: None    Stress: None   Relationships    Social connections     Talks on phone: None     Gets together: None     Attends Rastafari service: None     Active member of club or organization: None     Attends meetings of clubs or organizations: None     Relationship status: None    Intimate partner violence     Fear of current or ex partner: None     Emotionally abused: None     Physically abused: None     Forced sexual activity: None   Other Topics Concern    None   Social History Narrative    None       SCREENINGS    [place] Coma Scale  Eye Opening: Spontaneous  Best Verbal Response: Oriented  Best Motor Response: Obeys commands  [place] Coma Scale Score: 15        PHYSICAL EXAM    (up to 7 for level 4, 8 or more for level 5)     ED Triage Vitals [09/24/20 0011]   BP Temp Temp Source Pulse Resp SpO2 Height Weight   (!) 144/111 97.5 °F (36.4 °C) Oral 100 24 100 % -- 146 lb (66.2 kg)       Physical Exam  Physical Exam   Constitutional:  Appears well, well-developed and well-nourished. No distress noted. Non toxic in appearance. HENT:     Head: Normocephalic and atraumatic. Mouth/Throat: Oropharynx is clear and mucosa moist. No oropharyngeal exudate noted. Posterior pharynx is pink and noninjected. Eyes: Conjunctivae and EOM are normal. Pupils are equal, round, and reactive to light. No scleral icterus. Neck: Normal range of motion. Neck supple. No tracheal deviation present. Cardiovascular: Increased rate, irregular rhythm, normal heartsounds and intact distal pulses. Exam reveals no gallop or friction rub. No murmur heard. Pulmonary/Chest: Effort normal and breath sounds are symmetric and normal. No respiratory distress. There are no wheezes, rales or rhonchi. Abdominal: Soft. Bowel sounds are normal. No distension or no mass exhibitted. There is no tenderness, rebound, rigidity or guarding. Genitourinary:   No CVA tenderness noted on examination.   Musculoskeletal: Normal range of motion. No edema, tenderness or deformity. Lymphadenopathy:  No cervical adenopathy. Neurological:   alert and oriented to person, place, and time. Normal speech, normal comprehension, normal cognition,  Reflexes are normal.  There are no cranial nerve deficits. Normal muscle tone, motor and sensory function including SILT exhibited. Coordination normal and gait normal.   Skin: Skin is warm and dry. No rash noted. No diaphoresis. No erythema. No pallor. Psychiatric: Pleasant and cooperative. Normal mood and affect. Behavior is  normal. Judgment and thought content normal.     DIAGNOSTIC RESULTS     EKG: All EKG's are interpreted by the Emergency Department Physician who either signs or Co-signs this chart in the absence of a cardiologist.    He twelve-lead EKG was performed at 00 17. There is atrial fibrillation with rapid ventricular response with a ventricular rate 114 bpm.  There is normal, QRS duration, QT, QTC and axis. There are ST and T wave abnormalities in the inferior leads. RADIOLOGY:   Non-plain film images such as CT, Ultrasoundand MRI are read by the radiologist. Plain radiographic images are visualized and preliminarily interpreted by the emergency physician with the below findings:    Not indicated. Interpretation per the Radiologist below, if available at the time of this note:    XR CHEST PORTABLE   Final Result      Cardiomegaly and mild vascular congestion with interstitial edema. Bibasilar atelectasis is noted, and there is a small right pleural effusion.                ED BEDSIDE ULTRASOUND:   Performed by ED Physician - none    LABS:  Labs Reviewed   CBC WITH AUTO DIFFERENTIAL - Abnormal; Notable for the following components:       Result Value    RDW 15.7 (*)     Segs Absolute 7.30 (*)     All other components within normal limits   COMPREHENSIVE METABOLIC PANEL W/ REFLEX TO MG FOR LOW K - Abnormal; Notable for the following components:    Glucose 192 (*) cerebrovascular disease, pelvic fracture, hyperlipidemia, hypertension, TIA, tobacco abuse was transferred to [facility] for higher level care per patient request.  She was diagnosed with new onset CHF and atrial fibrillation with RVR which right normalized after diltiazem. .    She is improved, well hydrated, nontoxic, hemodynamically stable and satisfactory for discharge for outpatient management. Findings discussed at length with patient. The patient was evaluated during the global COVID-19  pandemic, and that diagnosis was suspected/considered upon their initial presentation. Their evaluation, treatment and testing was consistent with current guidelines for patients who present with complaints or symptoms that may be related to COVID-19 . COVID negative. Patient Course:   ED Course as of Sep 24 0432   Thu Sep 24, 2020   0113 I requested that he call be placed to transfer center to initiate transfer to [facility] per patient request.  Her primary care provider practices there. [MS]   0130 I spoke with hospitalist Dr. Nahun Mcclelland who excepted patient for transfer. [MS]   9005 Transport onsite. Rechecking blood pressure. [MS]      ED Course User Index  [MS] Trinity Clancy MD         ED Medicationsadministered this visit:    Medications   potassium bicarb-citric acid (EFFER-K) effervescent tablet 20 mEq (has no administration in time range)   dilTIAZem injection 15 mg (15 mg Intravenous Given 9/24/20 0040)   albuterol sulfate  (90 Base) MCG/ACT inhaler 2 puff (2 puffs Inhalation Given 9/24/20 0158)   furosemide (LASIX) injection 20 mg (20 mg Intravenous Given 9/24/20 0119)   methylPREDNISolone sodium (SOLU-MEDROL) injection 125 mg (125 mg Intravenous Given 9/24/20 0119)       New Prescriptions from this visit:    New Prescriptions    No medications on file       Follow-up:  No follow-up provider specified. Final Impression:   1. Atrial fibrillation with RVR ([address]    2.  Tobacco abuse 3. Essential hypertension    4. Congestive heart failure, unspecified HF chronicity, unspecified heart failure type ([facility]    5. Hypokalemia               (Please note that portions of this note werecompleted with a voice recognition program.  Efforts were made to edit the dictations but occasionally words are mis-transcribed.)    FINAL IMPRESSION      1. Atrial fibrillation with RVR ([address]    2. Tobacco abuse    3. Essential hypertension    4. Congestive heart failure, unspecified HF chronicity, unspecified heart failure type ([facility] [facility]    5. Hypokalemia          DISPOSITION/PLAN   DISPOSITION        PATIENT REFERRED TO:  No follow-up provider specified.     DISCHARGE MEDICATIONS:  New Prescriptions    No medications on file          (Please note that portions of this note were completed with a voice recognition program.  Efforts were made to edit the dictations but occasionally words are mis-transcribed.)    Micheline Mobley MD (electronically signed)  Attending Emergency Physician          Micheline Mobley MD  09/24/20 9055

## 2020-10-02 LAB
SARS-COV-2, RAPID: NOT DETECTED
SARS-COV-2: NORMAL
SARS-COV-2: NORMAL
SOURCE: NORMAL

## 2020-10-16 ENCOUNTER — APPOINTMENT (OUTPATIENT)
Dept: GENERAL RADIOLOGY | Age: 65
End: 2020-10-16
Payer: MEDICARE

## 2020-10-16 ENCOUNTER — HOSPITAL ENCOUNTER (EMERGENCY)
Age: 65
Discharge: ANOTHER HOSPITAL | End: 2020-10-16
Attending: FAMILY MEDICINE
Payer: MEDICARE

## 2020-10-16 VITALS
HEIGHT: 63 IN | WEIGHT: 160.8 LBS | TEMPERATURE: 97.5 F | DIASTOLIC BLOOD PRESSURE: 111 MMHG | OXYGEN SATURATION: 95 % | RESPIRATION RATE: 23 BRPM | SYSTOLIC BLOOD PRESSURE: 156 MMHG | BODY MASS INDEX: 28.49 KG/M2 | HEART RATE: 101 BPM

## 2020-10-16 LAB
-: ABNORMAL
ABSOLUTE EOS #: 0.2 K/UL (ref 0–0.4)
ABSOLUTE IMMATURE GRANULOCYTE: ABNORMAL K/UL (ref 0–0.3)
ABSOLUTE LYMPH #: 2.4 K/UL (ref 1–4.8)
ABSOLUTE MONO #: 0.7 K/UL (ref 0–1)
AMORPHOUS: ABNORMAL
ANION GAP SERPL CALCULATED.3IONS-SCNC: 12 MMOL/L (ref 9–17)
BACTERIA: ABNORMAL
BASOPHILS # BLD: 2 % (ref 0–2)
BASOPHILS ABSOLUTE: 0.2 K/UL (ref 0–0.2)
BILIRUBIN URINE: NEGATIVE
BNP INTERPRETATION: ABNORMAL
BUN BLDV-MCNC: 29 MG/DL (ref 8–23)
BUN/CREAT BLD: 24 (ref 9–20)
CALCIUM SERPL-MCNC: 9 MG/DL (ref 8.6–10.4)
CASTS UA: ABNORMAL /LPF
CHLORIDE BLD-SCNC: 104 MMOL/L (ref 98–107)
CO2: 22 MMOL/L (ref 20–31)
COLOR: YELLOW
COMMENT UA: ABNORMAL
CREAT SERPL-MCNC: 1.19 MG/DL (ref 0.5–0.9)
CRYSTALS, UA: ABNORMAL /HPF
DIFFERENTIAL TYPE: YES
EOSINOPHILS RELATIVE PERCENT: 2 % (ref 0–5)
EPITHELIAL CELLS UA: ABNORMAL /HPF
GFR AFRICAN AMERICAN: 55 ML/MIN
GFR NON-AFRICAN AMERICAN: 45 ML/MIN
GFR SERPL CREATININE-BSD FRML MDRD: ABNORMAL ML/MIN/{1.73_M2}
GFR SERPL CREATININE-BSD FRML MDRD: ABNORMAL ML/MIN/{1.73_M2}
GLUCOSE BLD-MCNC: 156 MG/DL (ref 70–99)
GLUCOSE URINE: NEGATIVE
HCT VFR BLD CALC: 39.5 % (ref 36–46)
HEMOGLOBIN: 12.8 G/DL (ref 12–16)
IMMATURE GRANULOCYTES: ABNORMAL %
KETONES, URINE: NEGATIVE
LEUKOCYTE ESTERASE, URINE: ABNORMAL
LYMPHOCYTES # BLD: 23 % (ref 15–40)
MCH RBC QN AUTO: 28.3 PG (ref 26–34)
MCHC RBC AUTO-ENTMCNC: 32.4 G/DL (ref 31–37)
MCV RBC AUTO: 87.3 FL (ref 80–100)
MONOCYTES # BLD: 7 % (ref 4–8)
MUCUS: ABNORMAL
NITRITE, URINE: NEGATIVE
NRBC AUTOMATED: ABNORMAL PER 100 WBC
OTHER OBSERVATIONS UA: ABNORMAL
PDW BLD-RTO: 17.2 % (ref 12.1–15.2)
PH UA: 6 (ref 5–8)
PLATELET # BLD: 195 K/UL (ref 140–450)
PLATELET ESTIMATE: ABNORMAL
PMV BLD AUTO: ABNORMAL FL (ref 6–12)
POTASSIUM SERPL-SCNC: 4.4 MMOL/L (ref 3.7–5.3)
PRO-BNP: ABNORMAL PG/ML
PROTEIN UA: ABNORMAL
RBC # BLD: 4.52 M/UL (ref 4–5.2)
RBC # BLD: ABNORMAL 10*6/UL
RBC UA: ABNORMAL /HPF (ref 0–2)
RENAL EPITHELIAL, UA: ABNORMAL /HPF
SARS-COV-2, RAPID: NOT DETECTED
SARS-COV-2: NORMAL
SARS-COV-2: NORMAL
SEG NEUTROPHILS: 66 % (ref 47–75)
SEGMENTED NEUTROPHILS ABSOLUTE COUNT: 6.9 K/UL (ref 2.5–7)
SODIUM BLD-SCNC: 138 MMOL/L (ref 135–144)
SOURCE: NORMAL
SPECIFIC GRAVITY UA: 1.01 (ref 1–1.03)
TRICHOMONAS: ABNORMAL
TROPONIN INTERP: NORMAL
TROPONIN T: <0.03 NG/ML
TROPONIN, HIGH SENSITIVITY: NORMAL NG/L (ref 0–14)
TURBIDITY: CLEAR
URINE HGB: ABNORMAL
UROBILINOGEN, URINE: NORMAL
WBC # BLD: 10.3 K/UL (ref 3.5–11)
WBC # BLD: ABNORMAL 10*3/UL
WBC UA: ABNORMAL /HPF
YEAST: ABNORMAL

## 2020-10-16 PROCEDURE — 2500000003 HC RX 250 WO HCPCS: Performed by: FAMILY MEDICINE

## 2020-10-16 PROCEDURE — 80048 BASIC METABOLIC PNL TOTAL CA: CPT

## 2020-10-16 PROCEDURE — 81001 URINALYSIS AUTO W/SCOPE: CPT

## 2020-10-16 PROCEDURE — 99283 EMERGENCY DEPT VISIT LOW MDM: CPT

## 2020-10-16 PROCEDURE — 71045 X-RAY EXAM CHEST 1 VIEW: CPT

## 2020-10-16 PROCEDURE — 6370000000 HC RX 637 (ALT 250 FOR IP): Performed by: FAMILY MEDICINE

## 2020-10-16 PROCEDURE — 84484 ASSAY OF TROPONIN QUANT: CPT

## 2020-10-16 PROCEDURE — U0002 COVID-19 LAB TEST NON-CDC: HCPCS

## 2020-10-16 PROCEDURE — 83880 ASSAY OF NATRIURETIC PEPTIDE: CPT

## 2020-10-16 PROCEDURE — 93005 ELECTROCARDIOGRAM TRACING: CPT | Performed by: FAMILY MEDICINE

## 2020-10-16 PROCEDURE — 96374 THER/PROPH/DIAG INJ IV PUSH: CPT

## 2020-10-16 PROCEDURE — 85025 COMPLETE CBC W/AUTO DIFF WBC: CPT

## 2020-10-16 RX ORDER — HYDROCODONE BITARTRATE AND ACETAMINOPHEN 5; 325 MG/1; MG/1
1 TABLET ORAL ONCE
Status: COMPLETED | OUTPATIENT
Start: 2020-10-16 | End: 2020-10-16

## 2020-10-16 RX ORDER — METOPROLOL TARTRATE 5 MG/5ML
5 INJECTION INTRAVENOUS ONCE
Status: COMPLETED | OUTPATIENT
Start: 2020-10-16 | End: 2020-10-16

## 2020-10-16 RX ADMIN — METOROPROLOL TARTRATE 5 MG: 5 INJECTION, SOLUTION INTRAVENOUS at 18:35

## 2020-10-16 RX ADMIN — HYDROCODONE BITARTRATE AND ACETAMINOPHEN 1 TABLET: 5; 325 TABLET ORAL at 18:35

## 2020-10-16 ASSESSMENT — PAIN SCALES - GENERAL: PAINLEVEL_OUTOF10: 6

## 2020-10-16 ASSESSMENT — ENCOUNTER SYMPTOMS
ABDOMINAL PAIN: 0
SHORTNESS OF BREATH: 1

## 2020-10-16 NOTE — ED PROVIDER NOTES
[facility]  eMERGENCY dEPARTMENT eNCOUnter          [facility]       Chief Complaint   Patient presents with    Shortness of Breath     Pt brought in by [facility] by request of her family doctor for increased SOB. Pt also c/o knee pain. Nurses Notes reviewed and I agree except as noted in the HPI. HISTORY OF PRESENT ILLNESS    Jacqueline Joe is a 77 y.o. female who presents to the emergency room via EMS from home, patient was seen been seen by home health aide who feels the patient is been steadily declining in overall health and strength, states it contacted PCPs office who advised her to be brought to the emergency room for evaluation of her concerns for exacerbation of CHF. Patient states \"I can hardly breathe\", when asked about timeline patient states \"for a while\". REVIEW OF SYSTEMS     Review of Systems   Constitutional: Negative for fever. Respiratory: Positive for shortness of breath. Cardiovascular: Positive for leg swelling. Negative for chest pain and palpitations. Gastrointestinal: Negative for abdominal pain. Genitourinary: Negative for dysuria. Musculoskeletal:        Right knee pain   Neurological: Positive for weakness. All other systems reviewed and are negative. PAST MEDICAL HISTORY    has a past medical history of Cerebral artery occlusion with cerebral infarction [facility]), Difficult intravenous access, Hydrocephalus ([facility] [address] [facility] Hyperlipidemia, Hypertension, MRSA (methicillin resistant staph aureus) culture positive, Smoker, TIA (transient ischemic attack), and Wears glasses. SURGICAL HISTORY      has a past surgical history that includes knee surgery (Right, 2008); knee surgery (Right); Tubal ligation (1984); Ventriculoperitoneal shunt (Right, 12/14/2016); and other surgical history (Right, 12/14/2016).     CURRENT MEDICATIONS       Previous Medications    CLONIDINE (CATAPRES) 0.2 MG TABLET    Take 1 tablet by mouth every 4 hours as Notable for the following components:    Pro-BNP 21,980 (*)     All other components within normal limits   BASIC METABOLIC PANEL W/ REFLEX TO MG FOR LOW K - Abnormal; Notable for the following components:    Glucose 156 (*)     BUN 29 (*)     CREATININE 1.19 (*)     Bun/Cre Ratio 24 (*)     GFR Non- 45 (*)     GFR  55 (*)     All other components within normal limits   URINALYSIS - Abnormal; Notable for the following components:    Urine Hgb 1+ (*)     Protein, UA 2+ (*)     Leukocyte Esterase, Urine 1+ (*)     All other components within normal limits   MICROSCOPIC URINALYSIS - Abnormal; Notable for the following components:    Bacteria, UA 2+ (*)     All other components within normal limits   TROPONIN   COVID-19       EMERGENCY DEPARTMENT COURSE:   Vitals:    Vitals:    10/16/20 1358 10/16/20 1728   BP: (!) 178/130    Pulse: 100 102   Resp: 20 24   Temp: 97.5 °F (36.4 °C)    TempSrc: Oral    SpO2: 98% 97%   Weight: 160 lb 12.8 oz (72.9 kg)    Height: 5' 3\" (1.6 m)      Patient history and physical exam taken at bedside, discussed patient symptoms and exam findings, discussed initial plan work-up to include EKG chest x-ray blood work IV access. Patient cardiac monitor, pulse ox, sitting semi-Fowlers in bed. EMR reviewed, noting history CVA, TIA, HTN, HLD, right knee surgery, smoker.     EKG as above    Chest x-ray reviewed    Initial labs reviewed    Discussed with patient my concern for exacerbation of CHF, as well as atrial fibrillation complicating, this patient has a history of UTIs and states that she has to urinate, will asked nursing to get her to the bathroom get urine sample    Urinalysis reviewed    Discussed with patient and patient's daughter now present, diagnosis exacerbation CHF with underlying atrial fibrillation, patient is saying she is having some right knee pain this is noted to be chronic for her, discussed need for admission, patient stating that she preferred to go to [facility] this is where her cardiologist is out of, will begin contact [address] once COVID testing returns    COVID-19 negative    Case was discussed with Dr. Barron Singh, hospitalist at [facility], regarding patient's presentation current work-up, accepted for transfer        FINAL IMPRESSION      1. Acute on chronic congestive heart failure, unspecified heart failure type ([address]    2. Atrial fibrillation, unspecified type ([address]    3. Chronic pain of right knee    4. General weakness          DISPOSITION/PLAN   trn    PATIENT REFERRED TO:  No follow-up provider specified. DISCHARGE MEDICATIONS:  New Prescriptions    No medications on file           Summation      Patient Course:  trn    ED Medications administered this visit:  Medications - No data to display    New Prescriptions from this visit:    New Prescriptions    No medications on file       Follow-up:  No follow-up provider specified. Final Impression:   1. Acute on chronic congestive heart failure, unspecified heart failure type ([address]    2. Atrial fibrillation, unspecified type ([address]    3. Chronic pain of right knee    4.  General weakness               (Please note that portions of this note were completed with a voice recognition program.  Efforts were made to edit the dictations but occasionally words are mis-transcribed.)    Armond Denver, MD Armond Denver, MD  10/16/20 8316

## 2020-10-17 LAB
EKG ATRIAL RATE: 90 BPM
EKG Q-T INTERVAL: 382 MS
EKG QRS DURATION: 78 MS
EKG QTC CALCULATION (BAZETT): 480 MS
EKG R AXIS: 25 DEGREES
EKG T AXIS: -126 DEGREES
EKG VENTRICULAR RATE: 95 BPM

## 2020-10-17 PROCEDURE — 93010 ELECTROCARDIOGRAM REPORT: CPT | Performed by: INTERNAL MEDICINE

## 2020-10-28 ENCOUNTER — HOSPITAL ENCOUNTER (OUTPATIENT)
Dept: GENERAL RADIOLOGY | Age: 65
Discharge: HOME | End: 2020-10-30
Payer: MEDICARE

## 2020-10-28 ENCOUNTER — HOSPITAL ENCOUNTER (OUTPATIENT)
Age: 65
Discharge: HOME | End: 2020-10-30
Payer: MEDICARE

## 2020-10-28 PROCEDURE — 73564 X-RAY EXAM KNEE 4 OR MORE: CPT

## 2020-10-28 PROCEDURE — 72170 X-RAY EXAM OF PELVIS: CPT

## 2021-01-12 ENCOUNTER — APPOINTMENT (OUTPATIENT)
Dept: GENERAL RADIOLOGY | Age: 66
End: 2021-01-12
Payer: MEDICARE

## 2021-01-12 ENCOUNTER — HOSPITAL ENCOUNTER (EMERGENCY)
Age: 66
Discharge: HOME | End: 2021-01-12
Attending: FAMILY MEDICINE
Payer: MEDICARE

## 2021-01-12 VITALS
DIASTOLIC BLOOD PRESSURE: 69 MMHG | TEMPERATURE: 98.2 F | HEART RATE: 96 BPM | SYSTOLIC BLOOD PRESSURE: 137 MMHG | RESPIRATION RATE: 18 BRPM | BODY MASS INDEX: 25.26 KG/M2 | WEIGHT: 142.6 LBS | OXYGEN SATURATION: 100 %

## 2021-01-12 DIAGNOSIS — M25.551 RIGHT HIP PAIN: Primary | ICD-10-CM

## 2021-01-12 DIAGNOSIS — G89.29 CHRONIC PAIN OF RIGHT KNEE: ICD-10-CM
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
## 2021-01-12 DIAGNOSIS — M25.561 CHRONIC PAIN OF RIGHT KNEE: ICD-10-CM

## 2021-01-12 PROCEDURE — 99283 EMERGENCY DEPT VISIT LOW MDM: CPT

## 2021-01-12 PROCEDURE — 73502 X-RAY EXAM HIP UNI 2-3 VIEWS: CPT

## 2021-01-12 PROCEDURE — 73564 X-RAY EXAM KNEE 4 OR MORE: CPT

## 2021-01-12 PROCEDURE — 6370000000 HC RX 637 (ALT 250 FOR IP): Performed by: FAMILY MEDICINE

## 2021-01-12 RX ORDER — CARVEDILOL 6.25 MG/1
6.25 TABLET ORAL 2 TIMES DAILY
COMMUNITY

## 2021-01-12 RX ORDER — HYDROCODONE BITARTRATE AND ACETAMINOPHEN 5; 325 MG/1; MG/1
1 TABLET ORAL ONCE
Status: COMPLETED | OUTPATIENT
Start: 2021-01-12 | End: 2021-01-12

## 2021-01-12 RX ORDER — FUROSEMIDE 40 MG/1
40 TABLET ORAL 2 TIMES DAILY
COMMUNITY

## 2021-01-12 RX ORDER — SPIRONOLACTONE 25 MG/1
25 TABLET ORAL 2 TIMES DAILY
COMMUNITY

## 2021-01-12 RX ORDER — TRAMADOL HYDROCHLORIDE 50 MG/1
50-100 TABLET ORAL EVERY 8 HOURS PRN
Qty: 30 TABLET | Refills: 0 | Status: SHIPPED | OUTPATIENT
Start: 2021-01-12 | End: 2021-01-17

## 2021-01-12 RX ADMIN — HYDROCODONE BITARTRATE AND ACETAMINOPHEN 1 TABLET: 5; 325 TABLET ORAL at 20:26

## 2021-01-12 ASSESSMENT — PAIN SCALES - GENERAL
PAINLEVEL_OUTOF10: 6
PAINLEVEL_OUTOF10: 8
PAINLEVEL_OUTOF10: 8

## 2021-01-12 ASSESSMENT — PAIN DESCRIPTION - PAIN TYPE: TYPE: ACUTE PAIN

## 2021-01-12 ASSESSMENT — PAIN DESCRIPTION - LOCATION: LOCATION: KNEE

## 2021-01-13 NOTE — ED PROVIDER NOTES
APIXABAN (ELIQUIS) 5 MG TABS TABLET    Take 5 mg by mouth 2 times daily    CARVEDILOL (COREG) 6.25 MG TABLET    Take 6.25 mg by mouth 2 times daily    CLONIDINE (CATAPRES) 0.2 MG TABLET    Take 1 tablet by mouth every 4 hours as needed for High Blood Pressure (SBP above 160)    FUROSEMIDE (LASIX) 40 MG TABLET    Take 40 mg by mouth 2 times daily    LISINOPRIL (PRINIVIL;ZESTRIL) 40 MG TABLET    Take 1 tablet by mouth daily    SIMVASTATIN (ZOCOR) 20 MG TABLET    Take 1 tablet by mouth nightly    SPIRONOLACTONE (ALDACTONE) 25 MG TABLET    Take 25 mg by mouth 2 times daily       ALLERGIES     Sulfa antibiotics, Adhesive tape, and Penicillins    FAMILY HISTORY       Family History   Problem Relation Age of Onset    Diabetes Father         IDDM    High Blood Pressure Sister     Cancer Sister         UNKNOWN    Diabetes Maternal Grandmother         IDDM    High Blood Pressure Maternal Grandmother     High Blood Pressure Sister           SOCIAL HISTORY       Social History     Socioeconomic History    Marital status:       Spouse name: None    Number of children: None    Years of education: None    Highest education level: None   Occupational History    None   Social Needs    Financial resource strain: None    Food insecurity     Worry: None     Inability: None    Transportation needs     Medical: None     Non-medical: None   Tobacco Use    Smoking status: Current Every Day Smoker     Packs/day: 1.00     Years: 40.00     Pack years: 40.00     Types: Cigarettes    Smokeless tobacco: Never Used   Substance and Sexual Activity    Alcohol use: No    Drug use: No    Sexual activity: None   Lifestyle    Physical activity     Days per week: None     Minutes per session: None    Stress: None   Relationships    Social connections     Talks on phone: None     Gets together: None     Attends Gnosticism service: None     Active member of club or organization: None     Attends meetings of clubs or organizations: None     Relationship status: None    Intimate partner violence     Fear of current or ex partner: None     Emotionally abused: None     Physically abused: None     Forced sexual activity: None   Other Topics Concern    None   Social History Narrative    None       SCREENINGS        [place] Coma Scale  Eye Opening: Spontaneous  Best Verbal Response: Oriented  Best Motor Response: Obeys commands  [place] Coma Scale Score: 15               PHYSICAL EXAM    (up to 7 for level 4, 8 or more for level 5)     ED Triage Vitals [01/12/21 1948]   BP Temp Temp Source Pulse Resp SpO2 Height Weight   137/69 98.2 °F (36.8 °C) Oral 96 18 100 % -- 142 lb 9.6 oz (64.7 kg)       Physical Exam  Musculoskeletal:      Comments: Patient has no direct tenderness over the right hip with range of motion causes discomfort. She has very difficult time with any movement of the right knee, there is multiple old scars from previous surgery. There is no obvious deformity, ankle and foot exam are normal         DIAGNOSTIC RESULTS     EKG: All EKG's are interpreted by the Emergency Department Physician who either signs or Co-signs this chart in the absence of a cardiologist.        RADIOLOGY:   Non-plain film images such as CT, Ultrasound and MRI are read by the radiologist. Plain radiographic images are visualized and preliminarily interpreted by the emergency physician with the below findings:  Views of the right knee shows no patella, previous internal fixators with a large screw in the tibial plateau.   There is no aseptic loosening, there is no evidence of acute fracture as interpreted by me  One view of the right hip shows complete loss of joint space with bone-on-bone but no fracture dislocation, the left hip is also showing advanced degenerative disease also with loss of joint space      Interpretation per the Radiologist below, if available at the time of this note:    XR KNEE RIGHT (MIN 4 VIEWS)    (Results Pending) XR HIP RIGHT (2-3 VIEWS)    (Results Pending)         ED BEDSIDE ULTRASOUND:   Performed by ED Physician - none    LABS:  Labs Reviewed - No data to display    All other labs were within normal range or not returned as of this dictation. EMERGENCY DEPARTMENT COURSE and DIFFERENTIAL DIAGNOSIS/MDM:   Vitals:    Vitals:    01/12/21 1948   BP: 137/69   Pulse: 96   Resp: 18   Temp: 98.2 °F (36.8 °C)   TempSrc: Oral   SpO2: 100%   Weight: 142 lb 9.6 oz (64.7 kg)           MDM      REASSESSMENT          CRITICAL CARE TIME       CONSULTS:  None    PROCEDURES:  Unless otherwise noted below, none     Procedures      FINAL IMPRESSION      1. Right hip pain    2. Fall on same level from slipping, tripping or stumbling, initial encounter    3. Chronic pain of right knee          DISPOSITION/PLAN   DISPOSITION Decision To Discharge 01/12/2021 09:17:22 PM      PATIENT REFERRED TO:  JAYLAN Kenyon  [facility] [address]  [phone]    Schedule an appointment as soon as possible for a visit         DISCHARGE MEDICATIONS:  New Prescriptions    TRAMADOL (ULTRAM) 50 MG TABLET    Take 1-2 tablets by mouth every 8 hours as needed for Pain for up to 5 days. Intended supply: 3 days. Take lowest dose possible to manage pain     Controlled Substances Monitoring:     No flowsheet data found.     (Please note that portions of this note were completed with a voice recognition program.  Efforts were made to edit the dictations but occasionally words are mis-transcribed.)    Bennie Lindo MD (electronically signed)  Attending Emergency Physician            Bennie Lindo MD  01/12/21 2746

## 2021-01-16 ENCOUNTER — HOSPITAL ENCOUNTER (EMERGENCY)
Age: 66
Discharge: HOME | End: 2021-01-16
Attending: FAMILY MEDICINE
Payer: MEDICARE

## 2021-01-16 VITALS
DIASTOLIC BLOOD PRESSURE: 67 MMHG | RESPIRATION RATE: 20 BRPM | SYSTOLIC BLOOD PRESSURE: 89 MMHG | HEART RATE: 130 BPM | OXYGEN SATURATION: 98 % | TEMPERATURE: 97.5 F | BODY MASS INDEX: 24.8 KG/M2 | WEIGHT: 140 LBS

## 2021-01-16 DIAGNOSIS — M79.671 RIGHT FOOT PAIN: ICD-10-CM

## 2021-01-16 DIAGNOSIS — M25.561 ACUTE PAIN OF RIGHT KNEE: Primary | ICD-10-CM

## 2021-01-16 PROCEDURE — 6370000000 HC RX 637 (ALT 250 FOR IP): Performed by: FAMILY MEDICINE

## 2021-01-16 PROCEDURE — 99283 EMERGENCY DEPT VISIT LOW MDM: CPT

## 2021-01-16 RX ORDER — HYDROCODONE BITARTRATE AND ACETAMINOPHEN 5; 325 MG/1; MG/1
2 TABLET ORAL ONCE
Status: COMPLETED | OUTPATIENT
Start: 2021-01-16 | End: 2021-01-16

## 2021-01-16 RX ADMIN — HYDROCODONE BITARTRATE AND ACETAMINOPHEN 1 TABLET: 5; 325 TABLET ORAL at 00:55

## 2021-01-16 ASSESSMENT — PAIN DESCRIPTION - ORIENTATION: ORIENTATION: RIGHT

## 2021-01-16 ASSESSMENT — PAIN DESCRIPTION - LOCATION: LOCATION: KNEE

## 2021-01-16 NOTE — ED PROVIDER NOTES
eMERGENCY dEPARTMENT eNCOUnter        [address] [facility]    Chief Complaint   Patient presents with    Knee Pain     pt states right knee pain, post fall last tuesday. HPI    Monserrat Carias is a 77 y.o. female who presents squad for right knee and foot pain which has been severe today and not relieved significantly by tramadol or Tylenol. A recent fall was evaluated at this ER with negative x-rays on the right foot and knee. She was prescribed tramadol. Since that time she has had impaired ambulation and pain steadily increases. Difficulty bearing weight. No new injury. She has good sensation and movement of the foot. No cold injury. No other painful joint. REVIEW OF SYSTEMS    All body systems reviewed. Pertinent positive and negative findings are mentioned in the HPI.      PAST MEDICAL HISTORY    Past Medical History:   Diagnosis Date    Cerebral artery occlusion with cerebral infarction ([facility] [address] LAST ONE 1990    X 2 NO DEFECITS    Difficult intravenous access     Hydrocephalus ([facility] [address] 08/2016    FORGETFUL AND SLEEPING ALOT    Hyperlipidemia 1996    ON RX    Hypertension 1996    ON RX    MRSA (methicillin resistant staph aureus) culture positive 07/02/2018    neck    Smoker     TIA (transient ischemic attack)     Wears glasses        SURGICAL HISTORY    Past Surgical History:   Procedure Laterality Date    KNEE SURGERY Right 2008    KNEE SURGERY Right     OVER 20 YRS AGO    OTHER SURGICAL HISTORY Right 12/14/2016     shunt     TUBAL LIGATION  1984    VENTRICULOPERITONEAL SHUNT Right 12/14/2016       CURRENT MEDICATIONS    Current Outpatient Rx   Medication Sig Dispense Refill    apixaban (ELIQUIS) 5 MG TABS tablet Take 5 mg by mouth 2 times daily      spironolactone (ALDACTONE) 25 MG tablet Take 25 mg by mouth 2 times daily      furosemide (LASIX) 40 MG tablet Take 40 mg by mouth 2 times daily      carvedilol (COREG) 6.25 MG tablet Take 6.25 mg by mouth 2 times daily  traMADol (ULTRAM) 50 MG tablet Take 1-2 tablets by mouth every 8 hours as needed for Pain for up to 5 days. Intended supply: 3 days. Take lowest dose possible to manage pain 30 tablet 0    simvastatin (ZOCOR) 20 MG tablet Take 1 tablet by mouth nightly 30 tablet 3    cloNIDine (CATAPRES) 0.2 MG tablet Take 1 tablet by mouth every 4 hours as needed for High Blood Pressure (SBP above 160) 60 tablet 3    lisinopril (PRINIVIL;ZESTRIL) 40 MG tablet Take 1 tablet by mouth daily 30 tablet 3       ALLERGIES    Allergies   Allergen Reactions    Sulfa Antibiotics Hives    Adhesive Tape Other (See Comments)     Skin tears    Penicillins Rash     Per patient 12/7/19       FAMILY HISTORY    Family History   Problem Relation Age of Onset    Diabetes Father         IDDM    High Blood Pressure Sister     Cancer Sister         UNKNOWN    Diabetes Maternal Grandmother         IDDM    High Blood Pressure Maternal Grandmother     High Blood Pressure Sister        SOCIAL HISTORY    Social History     Socioeconomic History    Marital status:       Spouse name: None    Number of children: None    Years of education: None    Highest education level: None   Occupational History    None   Social Needs    Financial resource strain: None    Food insecurity     Worry: None     Inability: None    Transportation needs     Medical: None     Non-medical: None   Tobacco Use    Smoking status: Current Every Day Smoker     Packs/day: 1.00     Years: 40.00     Pack years: 40.00     Types: Cigarettes    Smokeless tobacco: Never Used   Substance and Sexual Activity    Alcohol use: No    Drug use: No    Sexual activity: None   Lifestyle    Physical activity     Days per week: None     Minutes per session: None    Stress: None   Relationships    Social connections     Talks on phone: None     Gets together: None     Attends Mu-ism service: None     Active member of club or organization: None Attends meetings of clubs or organizations: None     Relationship status: None    Intimate partner violence     Fear of current or ex partner: None     Emotionally abused: None     Physically abused: None     Forced sexual activity: None   Other Topics Concern    None   Social History Narrative    None       PHYSICAL EXAM    VITAL SIGNS: BP (!) 99/55   Pulse 130   Temp 97.5 °F (36.4 °C) (Oral)   Resp 20   Wt 140 lb (63.5 kg)   LMP 12/13/1990 (Approximate)   SpO2 95%   BMI 24.80 kg/m²   Constitutional:  Well developed, chronically ill-appearing 78-year-old female with right knee and foot pain. HENT:  Atraumatic, external ears normal, nose normal, oropharynx moist.  Neck- normal range of motion, no tenderness, supple   Respiratory:  No respiratory distress, normal breathing pattern. No rib tenderness encountered. .   Cardiovascular: Regular rate and rhythm at 96/min. PMI is on the left. Low-grade systolic murmur heard at the left sternal border. There are palpable pedal pulses  GI:  Soft, nondistended, active bowel sounds, nontender   Musculoskeletal: Right knee tenderness on touch. Surgical scarring intact. The most pain is anterior and lateral in location. No erythema or significant effusion. The right foot is tender on the dorsal aspect. Achilles tendon is intact. No acute deformity noted. The spine is grossly aligned and nontender in the thoracic and lumbar areas. Integument:  Well hydrated, no generalized rash. Age-related skin changes. Neurologic: Alert and oriented female. She has increased pain of the right knee more so than the foot. Sensation is intact and hyperacute in the areas. She shows adequate motor control of her extremities. Her movements are in a controlled and coordinated manner. Facial expression is symmetrical.  Her speech is fluent and mentation is appropriate.     RADIOLOGY    January x-rays of the right knee are reviewed    ED [address] [facility] Pertinent Labs & Imaging studies reviewed. (See chart for details)    Summation      Patient Course: 47-year-old female with right knee and foot pain from recent injury. Her right knee has prior surgery. Recent x-rays are reviewed and noted to be stable. Pain control with Norco.  Findings are discussed with the patient. No new injury. Sent home with 2 Norco.  Will continue tramadol as already prescribed. Follow-up with orthopedic clinic. ED Medications administered this visit:    Medications   HYDROcodone-acetaminophen (NORCO) 5-325 MG per tablet 2 tablet (1 tablet Oral Given 1/16/21 0055)       New Prescriptions from this visit:    Discharge Medication List as of 1/16/2021  3:18 AM          Follow-up:  Aurea Patterson DO  [address] [address]    Schedule an appointment as soon as possible for a visit in 1 week          Final Impression:   1. Acute pain of right knee    2.  Right foot pain               (Please note that portions of this note were completed with a voice recognition program.  Efforts were made to edit the dictations but occasionally words are mis-transcribed.)          Gladys Ferguson DO  01/18/21 [id number]

## 2021-05-03 ENCOUNTER — APPOINTMENT (OUTPATIENT)
Dept: GENERAL RADIOLOGY | Age: 66
End: 2021-05-03
Payer: MEDICARE

## 2021-05-03 ENCOUNTER — APPOINTMENT (OUTPATIENT)
Dept: CT IMAGING | Age: 66
End: 2021-05-03
Payer: MEDICARE

## 2021-05-03 ENCOUNTER — HOSPITAL ENCOUNTER (EMERGENCY)
Age: 66
Discharge: HOME | End: 2021-05-04
Attending: INTERNAL MEDICINE
Payer: MEDICARE

## 2021-05-03 DIAGNOSIS — M45.4 ANKYLOSING SPONDYLITIS OF THORACIC REGION (HCC): ICD-10-CM

## 2021-05-03 DIAGNOSIS — S80.01XA CONTUSION OF RIGHT KNEE, INITIAL ENCOUNTER: ICD-10-CM

## 2021-05-03 DIAGNOSIS — S20.229A CONTUSION OF BACK WALL OF THORAX, INITIAL ENCOUNTER: ICD-10-CM
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
## 2021-05-03 PROCEDURE — 6360000002 HC RX W HCPCS: Performed by: INTERNAL MEDICINE

## 2021-05-03 PROCEDURE — 96374 THER/PROPH/DIAG INJ IV PUSH: CPT

## 2021-05-03 PROCEDURE — 99285 EMERGENCY DEPT VISIT HI MDM: CPT

## 2021-05-03 PROCEDURE — 73564 X-RAY EXAM KNEE 4 OR MORE: CPT

## 2021-05-03 PROCEDURE — 72128 CT CHEST SPINE W/O DYE: CPT

## 2021-05-03 PROCEDURE — 96375 TX/PRO/DX INJ NEW DRUG ADDON: CPT

## 2021-05-03 PROCEDURE — 72131 CT LUMBAR SPINE W/O DYE: CPT

## 2021-05-03 RX ORDER — ONDANSETRON 2 MG/ML
4 INJECTION INTRAMUSCULAR; INTRAVENOUS ONCE
Status: COMPLETED | OUTPATIENT
Start: 2021-05-03 | End: 2021-05-03

## 2021-05-03 RX ORDER — MORPHINE SULFATE 4 MG/ML
2 INJECTION, SOLUTION INTRAMUSCULAR; INTRAVENOUS ONCE
Status: COMPLETED | OUTPATIENT
Start: 2021-05-03 | End: 2021-05-03

## 2021-05-03 RX ADMIN — ONDANSETRON 4 MG: 2 INJECTION INTRAMUSCULAR; INTRAVENOUS at 21:51

## 2021-05-03 RX ADMIN — MORPHINE SULFATE 2 MG: 4 INJECTION, SOLUTION INTRAMUSCULAR; INTRAVENOUS at 21:51

## 2021-05-03 ASSESSMENT — PAIN SCALES - GENERAL: PAINLEVEL_OUTOF10: 8

## 2021-05-03 ASSESSMENT — PAIN DESCRIPTION - ORIENTATION: ORIENTATION: MID

## 2021-05-03 ASSESSMENT — PAIN DESCRIPTION - PAIN TYPE: TYPE: ACUTE PAIN

## 2021-05-04 ENCOUNTER — HOSPITAL ENCOUNTER (EMERGENCY)
Age: 66
Discharge: HOME | End: 2021-05-05
Attending: EMERGENCY MEDICINE
Payer: MEDICARE

## 2021-05-04 VITALS
BODY MASS INDEX: 25.52 KG/M2 | TEMPERATURE: 97.9 F | SYSTOLIC BLOOD PRESSURE: 128 MMHG | OXYGEN SATURATION: 95 % | HEART RATE: 101 BPM | HEIGHT: 63 IN | WEIGHT: 144 LBS | RESPIRATION RATE: 25 BRPM | DIASTOLIC BLOOD PRESSURE: 75 MMHG
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
## 2021-05-04 DIAGNOSIS — S39.012A STRAIN OF LUMBAR REGION, INITIAL ENCOUNTER: Primary | ICD-10-CM

## 2021-05-04 PROCEDURE — 99283 EMERGENCY DEPT VISIT LOW MDM: CPT

## 2021-05-04 NOTE — ED PROVIDER NOTES
[facility] ED  EMERGENCY DEPARTMENT ENCOUNTER      Pt Name: Aracely Novak  MRN: [id number]  [place] 1955  Date of evaluation: 5/3/2021  Provider: Troy Martinez MD    [address] [place] [address]       Chief Complaint   Patient presents with   [facility] Fall     Patient arrives to ER today with complaints of a fall from standing while patient was walking with her walker. Patient reports she fell backwards and landed on her thoracic spine. Patient denies LOC or hitting her head however is on blood thinners. HISTORY OF PRESENT ILLNESS   (Location/Symptom, Timing/Onset, Context/Setting, Quality, Duration, Modifying Factors, Severity)  Note limiting factors. Aracely Novak is a 77 y.o. female who has a history of normal pressure hydrocephalus, bronchitis, hypertension, coronary disease, presents to the emergency department for evaluation and management of back pain and right knee pain. She was ambulating with a walker when she tripped and fell backwards landing on her back. She denies hitting her head and has no neck pain. She denies use of anticoagulants. She has not tried thing for her symptoms. She has not been seen by any other providers for it. This patient is being evaluated during the COVID-19 pandemic. HPI    Nursing Notes were reviewed. REVIEW OF SYSTEMS    (2-9 systems for level 4, 10 or more for level 5)       REVIEW OF SYSTEMS    Constitutional: Negative for fatigue and fever. Respiratory: Negative for cough, chest tightness and shortness of breath. Cardiovascular: Negative for chest pain, palpitations and leg swelling. Gastrointestinal: Negative for abdominal distention, abdominal pain, diarrhea, nausea and vomiting. Musculoskeletal: Positive for right anterior knee pain, thoracic spine pain lumbar spine pain, negative for other arthralgias, and neck pain. Skin: Negative for wound, laceration, color change, pallor, rash .    Neurological: Negative for head injury, loss of consciousness, dizziness, speech difficulty, weakness, distal tingling/numbness and headaches. Hematological: Negative for anticoagulation, negative for adenopathy (later this was corrected)    Except as noted above the remainder of the review of systems was reviewed and negative.        PASTMEDICAL HISTORY     Past Medical History:   Diagnosis Date    Cerebral artery occlusion with cerebral infarction ([facility] [address] [address] LAST ONE 1990    X 2 NO DEFECITS    Difficult intravenous access     Hydrocephalus ([facility] [address] 08/2016    FORGETFUL AND SLEEPING ALOT    Hyperlipidemia 1996    ON RX    Hypertension 1996    ON RX    MRSA (methicillin resistant staph aureus) culture positive 07/02/2018    neck    Smoker     TIA (transient ischemic attack)     Wears glasses          SURGICAL HISTORY       Past Surgical History:   Procedure Laterality Date    KNEE SURGERY Right 2008    KNEE SURGERY Right     OVER 20 YRS AGO    OTHER SURGICAL HISTORY Right 12/14/2016     shunt     TUBAL LIGATION  1984    VENTRICULOPERITONEAL SHUNT Right 12/14/2016         CURRENT MEDICATIONS       Discharge Medication List as of 5/3/2021 11:57 PM      CONTINUE these medications which have NOT CHANGED    Details   apixaban (ELIQUIS) 5 MG TABS tablet Take 5 mg by mouth 2 times dailyHistorical Med      furosemide (LASIX) 40 MG tablet Take 40 mg by mouth 2 times dailyHistorical Med      carvedilol (COREG) 6.25 MG tablet Take 6.25 mg by mouth 2 times dailyHistorical Med      simvastatin (ZOCOR) 20 MG tablet Take 1 tablet by mouth nightly, Disp-30 tablet, R-3      lisinopril (PRINIVIL;ZESTRIL) 40 MG tablet Take 1 tablet by mouth daily, Disp-30 tablet, R-3      spironolactone (ALDACTONE) 25 MG tablet Take 25 mg by mouth 2 times dailyHistorical Med      cloNIDine (CATAPRES) 0.2 MG tablet Take 1 tablet by mouth every 4 hours as needed for High Blood Pressure (SBP above 160), Disp-60 tablet, R-3             ALLERGIES     Sulfa antibiotics, Adhesive tape, and Penicillins    FAMILY HISTORY       Family History   Problem Relation Age of Onset    Diabetes Father         IDDM    High Blood Pressure Sister     Cancer Sister         UNKNOWN    Diabetes Maternal Grandmother         IDDM    High Blood Pressure Maternal Grandmother     High Blood Pressure Sister           SOCIAL HISTORY       Social History     Socioeconomic History    Marital status:       Spouse name: None    Number of children: None    Years of education: None    Highest education level: None   Occupational History    None   Social Needs    Financial resource strain: None    Food insecurity     Worry: None     Inability: None    Transportation needs     Medical: None     Non-medical: None   Tobacco Use    Smoking status: Former Smoker     Packs/day: 1.00     Years: 40.00     Pack years: 40.00     Types: Cigarettes    Smokeless tobacco: Never Used   Substance and Sexual Activity    Alcohol use: No    Drug use: No    Sexual activity: None   Lifestyle    Physical activity     Days per week: None     Minutes per session: None    Stress: None   Relationships    Social connections     Talks on phone: None     Gets together: None     Attends Taoism service: None     Active member of club or organization: None     Attends meetings of clubs or organizations: None     Relationship status: None    Intimate partner violence     Fear of current or ex partner: None     Emotionally abused: None     Physically abused: None     Forced sexual activity: None   Other Topics Concern    None   Social History Narrative    None       SCREENINGS    [place] Coma Scale  Eye Opening: Spontaneous  Best Verbal Response: Oriented  Best Motor Response: Obeys commands  [place] Coma Scale Score: 15        PHYSICAL EXAM    (up to 7 for level 4, 8 or more for level 5)     ED Triage Vitals   BP Temp Temp Source Pulse Resp SpO2 Height Weight   05/03/21 2140 05/03/21 2141 05/03/21 2140 05/03/21 2140 05/03/21 2140 palpation. No paraspinous tenderness. Normal range of motion. Examination of the right knee demonstrates tenderness anteriorly without any focal abnormalities. Flexion of the right knee makes the pain worse. No edema, or deformity. No leg length discrepancy. No leg foreshortening. Compression of the pelvis does not elicit any pelvic pain. Lymphadenopathy:  No cervical adenopathy. Neurological:   Alert and oriented to person, place, and time. Reflexes are normal.  There are no cranial nerve deficits. Normal muscle tone, motor and sensory function exhibitted. Strength 5/5 in all extremities and torso. Coordination normal but gait not tested. Skin: Skin is warm and dry. No rash noted. No diaphoresis. No erythema. No pallor. Psychiatric:  normal mood and affect. Behavior is  normal. Judgment and thought content normal.     DIAGNOSTIC RESULTS     EKG: All EKG's are interpreted by the Emergency Department Physician who either signs or Co-signs this chart in the absence of a cardiologist.    Not indicated. RADIOLOGY:   Non-plain film images such as CT, Ultrasoundand MRI are read by the radiologist. Plain radiographic images are visualized and preliminarily interpreted by the emergency physician with the below findings:    Not indicated. Interpretation per the Radiologist below, if available at the time of this note:    XR KNEE RIGHT (MIN 4 VIEWS)   Final Result      No change. CT LUMBAR SPINE WO CONTRAST   Final Result      No fractures or loss of vertebral body height throughout the lumbar spine. CT THORACIC SPINE WO CONTRAST   Final Result   1. No fractures or loss of vertebral body height throughout the thoracic spine. 2. Suspected ankylosing spondylitis. ED BEDSIDE ULTRASOUND:   Performed by ED Physician - none    LABS:  Labs Reviewed - No data to display    All other labs were within normal range or not returned as of this dictation.     EMERGENCY DEPARTMENT COURSE and DIFFERENTIAL DIAGNOSIS/MDM:   Vitals:    Vitals:    05/04/21 0000 05/04/21 0007 05/04/21 0034 05/04/21 0045   BP: 136/66  128/75    Pulse: 102 106 101 101   Resp: 26 24 24 25   Temp:       TempSrc:       SpO2: 99% 97% 94% 95%   Weight:       Height:           Noted    MDM    CRITICAL CARE TIME   Total Critical Care time was 15 minutes, excluding separately reportable procedures. This included evaluation of data, bedside patient evaluation and care, contact and communication with other providers and hospitals and contact and communication with family. There was a high probability of clinically significant/ life threatening deterioration in the patient's condition which required my urgent intervention. [facility]  ED Course as of May 04 2006   Mon May 03, 2021   2131 Trauma alert      [MS]   2139 Trauma stand down    [MS]   2210 chart review shows she is on Eliquis    [MS]      ED Course User Index  [MS] Tracy Garcia MD       CONSULTS:  None    PROCEDURES:  Unless otherwise noted below, none     Procedures      Summation      Bella Beltran is a 77 y.o. female who has a history of normal pressure hydrocephalus, bronchitis, hypertension, coronary disease, anticoagulation presented after tripping and falling with right knee contusion, thoracic and lumbar contusions. She has evidence of progressive ankylosing spondylitis. NO concern for neck or head injury, chest, abdominal, pelvic injuries. She is well, pain controlled, well hydrated, nontoxic, hemodynamically stable and satisfactory for discharge for outpatient management. Findings discussed at length with patient. I recommended OTC analgesics. We discussed fall prevention. The patient was evaluated during the global COVID-19  pandemic, and that diagnosis was suspected/considered upon their initial presentation.   Their evaluation, treatment and testing was consistent with current guidelines for patients who present with complaints or symptoms that may be related to COVID-19 . The patient did not meet criteria for COVID-19 testing per current protocol. We recommend COVID-19 testing as per current recommendations if symptoms worsen including fever and difficulty breathing and any other concerns or indications should arise. I instructed the patient to followup with the PCP for evaluation of response to management of acute injury and illness, and with the PCP for management of hypertension and tobacco abuse. I instructed the patient to return to the ER if his condition worsens, if there is any concern for altered mental status, difficulty breathing, dehydration or loss of function. Patient Course:   ED Course as of May 04 2006   Mon May 03, 2021   2131 Trauma alert      [MS]   2139 Trauma stand down    [MS]   2210 chart review shows she is on Eliquis    [MS]      ED Course User Index  [MS] Ariella Brewster MD         ED Medicationsadministered this visit:    Medications   morphine sulfate (PF) injection 2 mg (2 mg Intravenous Given 5/3/21 2151)   ondansetron (ZOFRAN) injection 4 mg (4 mg Intravenous Given 5/3/21 2151)       New Prescriptions from this visit:    Discharge Medication List as of 5/3/2021 11:57 PM          Follow-up:  [facility] ED  [address] [facility]  [phone]  Go to   As needed, If symptoms worsen    JAYLAN Patel  [facility]  [phone]    Schedule an appointment as soon as possible for a visit in 1 week          Final Impression:   1. Fall, initial encounter    2. Contusion of right knee, initial encounter    3. Contusion of back wall of thorax, initial encounter    4. Ankylosing spondylitis of thoracic region [facility])               (Please note that portions of this note werecompleted with a voice recognition program.  Efforts were made to edit the dictations but occasionally words are mis-transcribed.)    FINAL IMPRESSION      1. Fall, initial encounter    2. Contusion of right knee, initial encounter    3. Contusion of back wall of thorax, initial encounter    4.  Ankylosing spondylitis of thoracic region [facility])          DISPOSITION/PLAN   DISPOSITION        PATIENT REFERRED TO:  [facility]  [address]  Go to   As needed, If symptoms worsen    Audry Kussmaul, PA  [facility] [phone]    Schedule an appointment as soon as possible for a visit in 1 week        DISCHARGE MEDICATIONS:  Discharge Medication List as of 5/3/2021 11:57 PM             (Please note that portions of this note were completed with a voice recognition program.  Efforts were made to edit the dictations but occasionally words are mis-transcribed.)    Kelin Villela MD (electronically signed)  Attending Emergency Physician          Kelin Villela MD  05/04/21 2006       Kelin Villela MD  05/04/21 2007

## 2021-05-05 VITALS
OXYGEN SATURATION: 90 % | SYSTOLIC BLOOD PRESSURE: 108 MMHG | HEART RATE: 96 BPM | RESPIRATION RATE: 21 BRPM | TEMPERATURE: 98.1 F | DIASTOLIC BLOOD PRESSURE: 67 MMHG

## 2021-05-05 PROCEDURE — 96372 THER/PROPH/DIAG INJ SC/IM: CPT

## 2021-05-05 PROCEDURE — 2580000003 HC RX 258: Performed by: EMERGENCY MEDICINE

## 2021-05-05 PROCEDURE — 99283 EMERGENCY DEPT VISIT LOW MDM: CPT

## 2021-05-05 PROCEDURE — 6360000002 HC RX W HCPCS: Performed by: EMERGENCY MEDICINE

## 2021-05-05 PROCEDURE — 96374 THER/PROPH/DIAG INJ IV PUSH: CPT

## 2021-05-05 RX ORDER — HYDROCODONE BITARTRATE AND ACETAMINOPHEN 5; 325 MG/1; MG/1
2 TABLET ORAL ONCE
Status: DISCONTINUED | OUTPATIENT
Start: 2021-05-05 | End: 2021-05-05 | Stop reason: HOSPADM

## 2021-05-05 RX ORDER — FENTANYL CITRATE 50 UG/ML
50 INJECTION, SOLUTION INTRAMUSCULAR; INTRAVENOUS ONCE
Status: COMPLETED | OUTPATIENT
Start: 2021-05-05 | End: 2021-05-05

## 2021-05-05 RX ORDER — METHYLPREDNISOLONE SODIUM SUCCINATE 40 MG/ML
40 INJECTION, POWDER, LYOPHILIZED, FOR SOLUTION INTRAMUSCULAR; INTRAVENOUS ONCE
Status: COMPLETED | OUTPATIENT
Start: 2021-05-05 | End: 2021-05-05

## 2021-05-05 RX ORDER — PREDNISONE 20 MG/1
40 TABLET ORAL DAILY
Qty: 10 TABLET | Refills: 0 | Status: SHIPPED | OUTPATIENT
Start: 2021-05-05 | End: 2021-05-08

## 2021-05-05 RX ORDER — 0.9 % SODIUM CHLORIDE 0.9 %
1000 INTRAVENOUS SOLUTION INTRAVENOUS ONCE
Status: COMPLETED | OUTPATIENT
Start: 2021-05-05 | End: 2021-05-05

## 2021-05-05 RX ADMIN — FENTANYL CITRATE 50 MCG: 50 INJECTION, SOLUTION INTRAMUSCULAR; INTRAVENOUS at 00:25

## 2021-05-05 RX ADMIN — SODIUM CHLORIDE 1000 ML: 9 INJECTION, SOLUTION INTRAVENOUS at 00:21

## 2021-05-05 RX ADMIN — METHYLPREDNISOLONE SODIUM SUCCINATE 40 MG: 40 INJECTION, POWDER, FOR SOLUTION INTRAMUSCULAR; INTRAVENOUS at 00:32

## 2021-05-05 ASSESSMENT — PAIN SCALES - GENERAL
PAINLEVEL_OUTOF10: 0
PAINLEVEL_OUTOF10: 10
PAINLEVEL_OUTOF10: 10

## 2021-05-05 ASSESSMENT — PAIN DESCRIPTION - FREQUENCY: FREQUENCY: CONTINUOUS

## 2021-05-05 ASSESSMENT — PAIN DESCRIPTION - ORIENTATION: ORIENTATION: LOWER

## 2021-05-05 ASSESSMENT — PAIN DESCRIPTION - PAIN TYPE: TYPE: ACUTE PAIN

## 2021-05-05 NOTE — ED PROVIDER NOTES
eMERGENCY dEPARTMENT eNCOUnter        [address] [facility]    Chief Complaint   Patient presents with    Back Pain     pt had a fall yesterday with her walker. she was seen in the ER yesterday and scans were negative. pt has returned d/t pain in her lower back. HPI    Satinder Calzada is a 77 y.o. female who presents to ED from home. By EMS. With complaint of low back pain. Onset since yesterday. Intensity of symptoms severe. Location of symptoms lower back. Patient fell yesterday with her walker. Patient injured her low back. Patient was seen yesterday. CT of the L-spine is negative for fracture. Patient presents today with persistent pain in her low back. No radiation of the pain no weakness or numbness of extremities. No other injuries. REVIEW OF SYSTEMS    All systems reviewed and positives are in the HPI.      PAST MEDICAL HISTORY    Past Medical History:   Diagnosis Date    Atrial fibrillation ([address]     Cerebral artery occlusion with cerebral infarction ([address] LAST ONE 1990    X 2 NO DEFECITS    Difficult intravenous access     Hydrocephalus ([facility]) 08/2016    FORGETFUL AND SLEEPING ALOT    Hyperlipidemia 1996    ON RX    Hypertension 1996    ON RX    MRSA (methicillin resistant staph aureus) culture positive 07/02/2018    neck    Smoker     TIA (transient ischemic attack)     Wears glasses        SURGICAL HISTORY    Past Surgical History:   Procedure Laterality Date    KNEE SURGERY Right 2008    KNEE SURGERY Right     OVER 20 YRS AGO    OTHER SURGICAL HISTORY Right 12/14/2016     shunt     TUBAL LIGATION  1984    VENTRICULOPERITONEAL SHUNT Right 12/14/2016       CURRENT MEDICATIONS      Current Outpatient Rx   Medication Sig Dispense Refill    predniSONE (DELTASONE) 20 MG tablet Take 2 tablets by mouth daily for 5 doses 10 tablet 0    apixaban (ELIQUIS) 5 MG TABS tablet Take 5 mg by mouth 2 times daily      spironolactone (ALDACTONE) 25 MG tablet Take 25 mg by mouth 2 times daily      furosemide (LASIX) 40 MG tablet Take 40 mg by mouth 2 times daily      carvedilol (COREG) 6.25 MG tablet Take 6.25 mg by mouth 2 times daily      simvastatin (ZOCOR) 20 MG tablet Take 1 tablet by mouth nightly 30 tablet 3    cloNIDine (CATAPRES) 0.2 MG tablet Take 1 tablet by mouth every 4 hours as needed for High Blood Pressure (SBP above 160) 60 tablet 3    lisinopril (PRINIVIL;ZESTRIL) 40 MG tablet Take 1 tablet by mouth daily 30 tablet 3       ALLERGIES      Allergies   Allergen Reactions    Sulfa Antibiotics Hives    Adhesive Tape Other (See Comments)     Skin tears    Penicillins Rash     Per patient 12/7/19       FAMILY HISTORY    Family History   Problem Relation Age of Onset    Diabetes Father         IDDM    High Blood Pressure Sister     Cancer Sister         UNKNOWN    Diabetes Maternal Grandmother         IDDM    High Blood Pressure Maternal Grandmother     High Blood Pressure Sister        SOCIAL HISTORY    Social History     Socioeconomic History    Marital status:       Spouse name: None    Number of children: None    Years of education: None    Highest education level: None   Occupational History    None   Social Needs    Financial resource strain: None    Food insecurity     Worry: None     Inability: None    Transportation needs     Medical: None     Non-medical: None   Tobacco Use    Smoking status: Former Smoker     Packs/day: 1.00     Years: 40.00     Pack years: 40.00     Types: Cigarettes    Smokeless tobacco: Never Used   Substance and Sexual Activity    Alcohol use: No    Drug use: No    Sexual activity: None   Lifestyle    Physical activity     Days per week: None     Minutes per session: None    Stress: None   Relationships    Social connections     Talks on phone: None     Gets together: None     Attends Sikhism service: None     Active member of club or organization: None     Attends meetings of clubs or organizations: None Relationship status: None    Intimate partner violence     Fear of current or ex partner: None     Emotionally abused: None     Physically abused: None     Forced sexual activity: None   Other Topics Concern    None   Social History Narrative    None       PHYSICAL EXAM    VITAL SIGNS: /79   Pulse 117   Temp 98.1 °F (36.7 °C) (Temporal)   Resp 26   LMP 12/13/1990 (Approximate)   SpO2 94%    Constitutional:  Well developed, well nourished, no acute distress, non-toxic appearance HENT:  Atraumatic, external ears normal, nose normal, oropharynx dry. Neck- normal range of motion, no tenderness, supple   Respiratory:  No respiratory distress, normal breath sounds. Cardiovascular: Irregularly irregular rhythm, tachycardic, history of A. fib GI:  Soft, nondistended, nontender, no organomegaly, no mass, no rebound, no guarding   :  No costovertebral angle tenderness   Musculoskeletal:  No edema, no tenderness, no deformities. Back-pain in the mid lumbar spine area. The pain is worse with movement. Integument:  Well hydrated   Neurologic: Patient is awake and alert x3. EKG        RADIOLOGY/PROCEDURES    No orders to display       Labs  Labs Reviewed - No data to display        Summation      Patient Course: Patient was given IV fluids in ED. Patient given fentanyl and Solu-Medrol. Patient will be sent home on prednisone. Continue to alternate ibuprofen and Tylenol. Return to ED if worse.     ED Medications administered this visit:    Medications   0.9 % sodium chloride bolus (1,000 mLs Intravenous New Bag 5/5/21 0021)   HYDROcodone-acetaminophen (NORCO) 5-325 MG per tablet 2 tablet (has no administration in time range)   fentaNYL (SUBLIMAZE) injection 50 mcg (50 mcg Intramuscular Given 5/5/21 0025)   methylPREDNISolone sodium (SOLU-MEDROL) injection 40 mg (40 mg Intravenous Given 5/5/21 0032)       New Prescriptions from this visit:    New Prescriptions    PREDNISONE (DELTASONE) 20 MG TABLET Take 2 tablets by mouth daily for 5 doses       Follow-up:  [facility] ED  [address] [facility] [address] [phone]    As needed, If symptoms worsen        Final Impression:   1. Strain of lumbar region, initial encounter    2.  Fall, initial encounter               (Please note that portions of this note were completed with a voice recognition program.  Efforts were made to edit the dictations but occasionally words are mis-transcribed.)      Bhumi Rock MD  05/05/21 0098

## 2021-05-05 NOTE — ED NOTES
Pt placed on 2L nasal cannula for shallow breathing and pulse ox of 87% on room air. Pt given instructions to take deep breaths through her nose. Her pulse ox is 94% on 2L nasal cannula.      Ryan Grant RN  05/05/21 [id number]

## 2021-05-08 ENCOUNTER — APPOINTMENT (OUTPATIENT)
Dept: GENERAL RADIOLOGY | Age: 66
DRG: 690 | End: 2021-05-08
Payer: MEDICARE

## 2021-05-08 ENCOUNTER — HOSPITAL ENCOUNTER (INPATIENT)
Age: 66
LOS: 4 days | Discharge: HOME | DRG: 690 | End: 2021-05-12
Attending: FAMILY MEDICINE | Admitting: INTERNAL MEDICINE
Payer: MEDICARE

## 2021-05-08 ENCOUNTER — APPOINTMENT (OUTPATIENT)
Dept: CT IMAGING | Age: 66
DRG: 690 | End: 2021-05-08
Payer: MEDICARE

## 2021-05-08 DIAGNOSIS — N39.0 URINARY TRACT INFECTION WITHOUT HEMATURIA, SITE UNSPECIFIED: Primary | ICD-10-CM

## 2021-05-08 DIAGNOSIS — M54.50 ACUTE BILATERAL LOW BACK PAIN WITHOUT SCIATICA: ICD-10-CM

## 2021-05-08 DIAGNOSIS — E86.0 DEHYDRATION: ICD-10-CM

## 2021-05-08 DIAGNOSIS — I48.91 ATRIAL FIBRILLATION WITH RAPID VENTRICULAR RESPONSE (HCC): ICD-10-CM

## 2021-05-08 LAB
-: NORMAL
ABSOLUTE EOS #: 0 K/UL (ref 0–0.4)
ABSOLUTE IMMATURE GRANULOCYTE: ABNORMAL K/UL (ref 0–0.3)
ABSOLUTE LYMPH #: 1.5 K/UL (ref 1–4.8)
ABSOLUTE MONO #: 1.1 K/UL (ref 0–1)
ALBUMIN SERPL-MCNC: 4 G/DL (ref 3.5–5.2)
ALBUMIN/GLOBULIN RATIO: ABNORMAL (ref 1–2.5)
ALP BLD-CCNC: 106 U/L (ref 35–104)
ALT SERPL-CCNC: 6 U/L (ref 5–33)
AMORPHOUS: NORMAL
ANION GAP SERPL CALCULATED.3IONS-SCNC: 14 MMOL/L (ref 9–17)
AST SERPL-CCNC: 16 U/L
BACTERIA: NORMAL
BASOPHILS # BLD: 0 % (ref 0–2)
BASOPHILS ABSOLUTE: 0 K/UL (ref 0–0.2)
BILIRUB SERPL-MCNC: 1.35 MG/DL (ref 0.3–1.2)
BILIRUBIN URINE: NEGATIVE
BUN BLDV-MCNC: 28 MG/DL (ref 8–23)
BUN/CREAT BLD: ABNORMAL (ref 9–20)
CALCIUM SERPL-MCNC: 9.1 MG/DL (ref 8.6–10.4)
CASTS UA: NORMAL /LPF
CHLORIDE BLD-SCNC: 100 MMOL/L (ref 98–107)
CO2: 24 MMOL/L (ref 20–31)
COLOR: YELLOW
COMMENT UA: ABNORMAL
CREAT SERPL-MCNC: 0.92 MG/DL (ref 0.5–0.9)
CRYSTALS, UA: NORMAL /HPF
DIFFERENTIAL TYPE: YES
EOSINOPHILS RELATIVE PERCENT: 0 % (ref 0–5)
EPITHELIAL CELLS UA: NORMAL /HPF
GFR AFRICAN AMERICAN: >60 ML/MIN
GFR NON-AFRICAN AMERICAN: >60 ML/MIN
GFR SERPL CREATININE-BSD FRML MDRD: ABNORMAL ML/MIN/{1.73_M2}
GFR SERPL CREATININE-BSD FRML MDRD: ABNORMAL ML/MIN/{1.73_M2}
GLUCOSE BLD-MCNC: 211 MG/DL (ref 70–99)
GLUCOSE URINE: NEGATIVE
HCT VFR BLD CALC: 45.7 % (ref 36–46)
HEMOGLOBIN: 15.1 G/DL (ref 12–16)
IMMATURE GRANULOCYTES: ABNORMAL %
INR BLD: 1.2
KETONES, URINE: ABNORMAL
LACTIC ACID, SEPSIS WHOLE BLOOD: NORMAL MMOL/L (ref 0.5–1.9)
LACTIC ACID, SEPSIS: 1.8 MMOL/L (ref 0.5–1.9)
LEUKOCYTE ESTERASE, URINE: ABNORMAL
LYMPHOCYTES # BLD: 9 % (ref 15–40)
MCH RBC QN AUTO: 28.8 PG (ref 26–34)
MCHC RBC AUTO-ENTMCNC: 33.1 G/DL (ref 31–37)
MCV RBC AUTO: 86.8 FL (ref 80–100)
MONOCYTES # BLD: 7 % (ref 4–8)
MUCUS: NORMAL
NITRITE, URINE: NEGATIVE
NRBC AUTOMATED: ABNORMAL PER 100 WBC
OTHER OBSERVATIONS UA: NORMAL
PARTIAL THROMBOPLASTIN TIME: 25.9 SEC (ref 23.9–33.8)
PDW BLD-RTO: 14.7 % (ref 12.1–15.2)
PH UA: 6 (ref 5–8)
PLATELET # BLD: 239 K/UL (ref 140–450)
PLATELET ESTIMATE: ABNORMAL
PMV BLD AUTO: ABNORMAL FL (ref 6–12)
POTASSIUM SERPL-SCNC: 4.8 MMOL/L (ref 3.7–5.3)
PROTEIN UA: ABNORMAL
PROTHROMBIN TIME: 14.9 SEC (ref 11.5–14.2)
RBC # BLD: 5.26 M/UL (ref 4–5.2)
RBC # BLD: ABNORMAL 10*6/UL
RBC UA: NORMAL /HPF (ref 0–2)
RENAL EPITHELIAL, UA: NORMAL /HPF
SARS-COV-2, RAPID: NOT DETECTED
SEG NEUTROPHILS: 84 % (ref 47–75)
SEGMENTED NEUTROPHILS ABSOLUTE COUNT: 14 K/UL (ref 2.5–7)
SODIUM BLD-SCNC: 138 MMOL/L (ref 135–144)
SPECIFIC GRAVITY UA: 1.02 (ref 1–1.03)
SPECIMEN DESCRIPTION: NORMAL
TOTAL PROTEIN: 7.1 G/DL (ref 6.4–8.3)
TRICHOMONAS: NORMAL
TROPONIN INTERP: ABNORMAL
TROPONIN T: ABNORMAL NG/ML
TROPONIN, HIGH SENSITIVITY: 18 NG/L (ref 0–14)
TURBIDITY: CLEAR
URINE HGB: ABNORMAL
UROBILINOGEN, URINE: NORMAL
WBC # BLD: 16.6 K/UL (ref 3.5–11)
WBC # BLD: ABNORMAL 10*3/UL
WBC UA: NORMAL /HPF
YEAST: NORMAL

## 2021-05-08 PROCEDURE — 81001 URINALYSIS AUTO W/SCOPE: CPT

## 2021-05-08 PROCEDURE — 96372 THER/PROPH/DIAG INJ SC/IM: CPT

## 2021-05-08 PROCEDURE — 72110 X-RAY EXAM L-2 SPINE 4/>VWS: CPT

## 2021-05-08 PROCEDURE — 6360000002 HC RX W HCPCS: Performed by: INTERNAL MEDICINE

## 2021-05-08 PROCEDURE — 94761 N-INVAS EAR/PLS OXIMETRY MLT: CPT

## 2021-05-08 PROCEDURE — 85025 COMPLETE CBC W/AUTO DIFF WBC: CPT

## 2021-05-08 PROCEDURE — 99285 EMERGENCY DEPT VISIT HI MDM: CPT

## 2021-05-08 PROCEDURE — 87077 CULTURE AEROBIC IDENTIFY: CPT

## 2021-05-08 PROCEDURE — 83036 HEMOGLOBIN GLYCOSYLATED A1C: CPT

## 2021-05-08 PROCEDURE — 87040 BLOOD CULTURE FOR BACTERIA: CPT

## 2021-05-08 PROCEDURE — 6360000002 HC RX W HCPCS: Performed by: FAMILY MEDICINE

## 2021-05-08 PROCEDURE — 83605 ASSAY OF LACTIC ACID: CPT

## 2021-05-08 PROCEDURE — 6370000000 HC RX 637 (ALT 250 FOR IP): Performed by: FAMILY MEDICINE

## 2021-05-08 PROCEDURE — 85610 PROTHROMBIN TIME: CPT

## 2021-05-08 PROCEDURE — 87086 URINE CULTURE/COLONY COUNT: CPT

## 2021-05-08 PROCEDURE — 87186 SC STD MICRODIL/AGAR DIL: CPT

## 2021-05-08 PROCEDURE — 70450 CT HEAD/BRAIN W/O DYE: CPT

## 2021-05-08 PROCEDURE — 85730 THROMBOPLASTIN TIME PARTIAL: CPT

## 2021-05-08 PROCEDURE — 93005 ELECTROCARDIOGRAM TRACING: CPT | Performed by: FAMILY MEDICINE

## 2021-05-08 PROCEDURE — 84484 ASSAY OF TROPONIN QUANT: CPT

## 2021-05-08 PROCEDURE — 2500000003 HC RX 250 WO HCPCS: Performed by: FAMILY MEDICINE

## 2021-05-08 PROCEDURE — 96361 HYDRATE IV INFUSION ADD-ON: CPT

## 2021-05-08 PROCEDURE — 6370000000 HC RX 637 (ALT 250 FOR IP): Performed by: INTERNAL MEDICINE

## 2021-05-08 PROCEDURE — 87635 SARS-COV-2 COVID-19 AMP PRB: CPT

## 2021-05-08 PROCEDURE — 2580000003 HC RX 258: Performed by: INTERNAL MEDICINE

## 2021-05-08 PROCEDURE — 1200000000 HC SEMI PRIVATE

## 2021-05-08 PROCEDURE — 2580000003 HC RX 258: Performed by: FAMILY MEDICINE

## 2021-05-08 PROCEDURE — 96374 THER/PROPH/DIAG INJ IV PUSH: CPT

## 2021-05-08 PROCEDURE — 80053 COMPREHEN METABOLIC PANEL: CPT

## 2021-05-08 RX ORDER — NITROFURANTOIN 25; 75 MG/1; MG/1
100 CAPSULE ORAL 2 TIMES DAILY
Qty: 28 CAPSULE | Refills: 0 | Status: SHIPPED | OUTPATIENT
Start: 2021-05-08 | End: 2021-05-12 | Stop reason: HOSPADM

## 2021-05-08 RX ORDER — ONDANSETRON 2 MG/ML
4 INJECTION INTRAMUSCULAR; INTRAVENOUS EVERY 6 HOURS PRN
Status: DISCONTINUED | OUTPATIENT
Start: 2021-05-08 | End: 2021-05-12 | Stop reason: HOSPADM

## 2021-05-08 RX ORDER — LISINOPRIL 10 MG/1
40 TABLET ORAL DAILY
Status: DISCONTINUED | OUTPATIENT
Start: 2021-05-08 | End: 2021-05-12 | Stop reason: HOSPADM

## 2021-05-08 RX ORDER — CARVEDILOL 12.5 MG/1
6.25 TABLET ORAL 2 TIMES DAILY
Status: DISCONTINUED | OUTPATIENT
Start: 2021-05-08 | End: 2021-05-12 | Stop reason: HOSPADM

## 2021-05-08 RX ORDER — LEVOFLOXACIN 5 MG/ML
500 INJECTION, SOLUTION INTRAVENOUS EVERY 24 HOURS
Status: DISCONTINUED | OUTPATIENT
Start: 2021-05-08 | End: 2021-05-10 | Stop reason: DRUGHIGH

## 2021-05-08 RX ORDER — SODIUM CHLORIDE 0.9 % (FLUSH) 0.9 %
5-40 SYRINGE (ML) INJECTION PRN
Status: DISCONTINUED | OUTPATIENT
Start: 2021-05-08 | End: 2021-05-12 | Stop reason: HOSPADM

## 2021-05-08 RX ORDER — DILTIAZEM HYDROCHLORIDE 120 MG/1
120 CAPSULE, COATED, EXTENDED RELEASE ORAL DAILY
Qty: 30 CAPSULE | Refills: 0 | Status: SHIPPED | OUTPATIENT
Start: 2021-05-08

## 2021-05-08 RX ORDER — NITROFURANTOIN 25; 75 MG/1; MG/1
100 CAPSULE ORAL ONCE
Status: COMPLETED | OUTPATIENT
Start: 2021-05-08 | End: 2021-05-08

## 2021-05-08 RX ORDER — DILTIAZEM HYDROCHLORIDE 120 MG/1
120 CAPSULE, COATED, EXTENDED RELEASE ORAL ONCE
Status: COMPLETED | OUTPATIENT
Start: 2021-05-08 | End: 2021-05-08

## 2021-05-08 RX ORDER — ACETAMINOPHEN 325 MG/1
650 TABLET ORAL EVERY 6 HOURS PRN
Status: DISCONTINUED | OUTPATIENT
Start: 2021-05-08 | End: 2021-05-12 | Stop reason: HOSPADM

## 2021-05-08 RX ORDER — ACETAMINOPHEN 650 MG/1
650 SUPPOSITORY RECTAL EVERY 6 HOURS PRN
Status: DISCONTINUED | OUTPATIENT
Start: 2021-05-08 | End: 2021-05-12 | Stop reason: HOSPADM

## 2021-05-08 RX ORDER — SODIUM CHLORIDE 9 MG/ML
INJECTION, SOLUTION INTRAVENOUS CONTINUOUS
Status: DISCONTINUED | OUTPATIENT
Start: 2021-05-08 | End: 2021-05-11

## 2021-05-08 RX ORDER — TRAZODONE HYDROCHLORIDE 50 MG/1
100 TABLET ORAL NIGHTLY PRN
Status: DISCONTINUED | OUTPATIENT
Start: 2021-05-08 | End: 2021-05-12 | Stop reason: HOSPADM

## 2021-05-08 RX ORDER — KETOROLAC TROMETHAMINE 30 MG/ML
15 INJECTION, SOLUTION INTRAMUSCULAR; INTRAVENOUS ONCE
Status: COMPLETED | OUTPATIENT
Start: 2021-05-08 | End: 2021-05-08

## 2021-05-08 RX ORDER — TRAMADOL HYDROCHLORIDE 50 MG/1
50 TABLET ORAL ONCE
Status: COMPLETED | OUTPATIENT
Start: 2021-05-08 | End: 2021-05-08

## 2021-05-08 RX ORDER — SPIRONOLACTONE 25 MG/1
25 TABLET ORAL 2 TIMES DAILY
Status: DISCONTINUED | OUTPATIENT
Start: 2021-05-08 | End: 2021-05-12 | Stop reason: HOSPADM

## 2021-05-08 RX ORDER — KETOROLAC TROMETHAMINE 30 MG/ML
15 INJECTION, SOLUTION INTRAMUSCULAR; INTRAVENOUS EVERY 6 HOURS PRN
Status: DISCONTINUED | OUTPATIENT
Start: 2021-05-08 | End: 2021-05-12 | Stop reason: HOSPADM

## 2021-05-08 RX ORDER — SODIUM CHLORIDE 0.9 % (FLUSH) 0.9 %
5-40 SYRINGE (ML) INJECTION EVERY 12 HOURS SCHEDULED
Status: DISCONTINUED | OUTPATIENT
Start: 2021-05-08 | End: 2021-05-12 | Stop reason: HOSPADM

## 2021-05-08 RX ORDER — ATORVASTATIN CALCIUM 20 MG/1
10 TABLET, FILM COATED ORAL DAILY
Status: DISCONTINUED | OUTPATIENT
Start: 2021-05-08 | End: 2021-05-12 | Stop reason: HOSPADM

## 2021-05-08 RX ORDER — POLYETHYLENE GLYCOL 3350 17 G/17G
17 POWDER, FOR SOLUTION ORAL DAILY PRN
Status: DISCONTINUED | OUTPATIENT
Start: 2021-05-08 | End: 2021-05-12 | Stop reason: HOSPADM

## 2021-05-08 RX ORDER — 0.9 % SODIUM CHLORIDE 0.9 %
1000 INTRAVENOUS SOLUTION INTRAVENOUS ONCE
Status: COMPLETED | OUTPATIENT
Start: 2021-05-08 | End: 2021-05-08

## 2021-05-08 RX ORDER — SODIUM CHLORIDE 9 MG/ML
25 INJECTION, SOLUTION INTRAVENOUS PRN
Status: DISCONTINUED | OUTPATIENT
Start: 2021-05-08 | End: 2021-05-12 | Stop reason: HOSPADM

## 2021-05-08 RX ORDER — PROMETHAZINE HYDROCHLORIDE 25 MG/1
12.5 TABLET ORAL EVERY 6 HOURS PRN
Status: DISCONTINUED | OUTPATIENT
Start: 2021-05-08 | End: 2021-05-12 | Stop reason: HOSPADM

## 2021-05-08 RX ORDER — FUROSEMIDE 20 MG/1
40 TABLET ORAL 2 TIMES DAILY
Status: DISCONTINUED | OUTPATIENT
Start: 2021-05-08 | End: 2021-05-12 | Stop reason: HOSPADM

## 2021-05-08 RX ORDER — NITROFURANTOIN 25; 75 MG/1; MG/1
100 CAPSULE ORAL ONCE
Status: DISCONTINUED | OUTPATIENT
Start: 2021-05-08 | End: 2021-05-12 | Stop reason: HOSPADM

## 2021-05-08 RX ORDER — DILTIAZEM HYDROCHLORIDE 5 MG/ML
15 INJECTION INTRAVENOUS ONCE
Status: COMPLETED | OUTPATIENT
Start: 2021-05-08 | End: 2021-05-08

## 2021-05-08 RX ADMIN — ONDANSETRON 4 MG: 2 INJECTION INTRAMUSCULAR; INTRAVENOUS at 23:22

## 2021-05-08 RX ADMIN — KETOROLAC TROMETHAMINE 15 MG: 30 INJECTION, SOLUTION INTRAMUSCULAR at 18:40

## 2021-05-08 RX ADMIN — SODIUM CHLORIDE, PRESERVATIVE FREE 10 ML: 5 INJECTION INTRAVENOUS at 21:18

## 2021-05-08 RX ADMIN — SODIUM CHLORIDE: 9 INJECTION, SOLUTION INTRAVENOUS at 21:17

## 2021-05-08 RX ADMIN — CARVEDILOL 6.25 MG: 12.5 TABLET, FILM COATED ORAL at 21:16

## 2021-05-08 RX ADMIN — TRAMADOL HYDROCHLORIDE 50 MG: 50 TABLET, FILM COATED ORAL at 21:56

## 2021-05-08 RX ADMIN — SODIUM CHLORIDE 1000 ML: 9 INJECTION, SOLUTION INTRAVENOUS at 17:26

## 2021-05-08 RX ADMIN — FUROSEMIDE 40 MG: 20 TABLET ORAL at 21:16

## 2021-05-08 RX ADMIN — APIXABAN 5 MG: 5 TABLET, FILM COATED ORAL at 21:16

## 2021-05-08 RX ADMIN — DILTIAZEM HYDROCHLORIDE 15 MG: 5 INJECTION INTRAVENOUS at 17:54

## 2021-05-08 RX ADMIN — TRAZODONE HYDROCHLORIDE 100 MG: 50 TABLET ORAL at 21:56

## 2021-05-08 RX ADMIN — NITROFURANTOIN (MONOHYDRATE/MACROCRYSTALS) 100 MG: 75; 25 CAPSULE ORAL at 18:41

## 2021-05-08 RX ADMIN — DILTIAZEM HYDROCHLORIDE 120 MG: 120 CAPSULE, COATED, EXTENDED RELEASE ORAL at 19:09

## 2021-05-08 RX ADMIN — SPIRONOLACTONE 25 MG: 25 TABLET, FILM COATED ORAL at 21:20

## 2021-05-08 RX ADMIN — LEVOFLOXACIN 500 MG: 5 INJECTION, SOLUTION INTRAVENOUS at 21:17

## 2021-05-08 ASSESSMENT — PAIN SCALES - GENERAL
PAINLEVEL_OUTOF10: 8

## 2021-05-08 ASSESSMENT — PAIN DESCRIPTION - DESCRIPTORS: DESCRIPTORS: CONSTANT

## 2021-05-08 ASSESSMENT — PAIN DESCRIPTION - PROGRESSION: CLINICAL_PROGRESSION: GRADUALLY WORSENING

## 2021-05-08 ASSESSMENT — PAIN DESCRIPTION - PAIN TYPE: TYPE: ACUTE PAIN

## 2021-05-09 LAB
ABSOLUTE EOS #: 0.1 K/UL (ref 0–0.4)
ABSOLUTE IMMATURE GRANULOCYTE: ABNORMAL K/UL (ref 0–0.3)
ABSOLUTE LYMPH #: 2 K/UL (ref 1–4.8)
ABSOLUTE MONO #: 0.9 K/UL (ref 0–1)
ANION GAP SERPL CALCULATED.3IONS-SCNC: 7 MMOL/L (ref 9–17)
BASOPHILS # BLD: 1 % (ref 0–2)
BASOPHILS ABSOLUTE: 0.1 K/UL (ref 0–0.2)
BUN BLDV-MCNC: 27 MG/DL (ref 8–23)
BUN/CREAT BLD: 28 (ref 9–20)
CALCIUM SERPL-MCNC: 8.2 MG/DL (ref 8.6–10.4)
CHLORIDE BLD-SCNC: 106 MMOL/L (ref 98–107)
CO2: 26 MMOL/L (ref 20–31)
CREAT SERPL-MCNC: 0.96 MG/DL (ref 0.5–0.9)
DIFFERENTIAL TYPE: YES
EKG ATRIAL RATE: 125 BPM
EKG Q-T INTERVAL: 346 MS
EKG QRS DURATION: 86 MS
EKG QTC CALCULATION (BAZETT): 505 MS
EKG R AXIS: 41 DEGREES
EKG T AXIS: -141 DEGREES
EKG VENTRICULAR RATE: 128 BPM
EOSINOPHILS RELATIVE PERCENT: 1 % (ref 0–5)
GFR AFRICAN AMERICAN: >60 ML/MIN
GFR NON-AFRICAN AMERICAN: 58 ML/MIN
GFR SERPL CREATININE-BSD FRML MDRD: ABNORMAL ML/MIN/{1.73_M2}
GFR SERPL CREATININE-BSD FRML MDRD: ABNORMAL ML/MIN/{1.73_M2}
GLUCOSE BLD-MCNC: 120 MG/DL (ref 70–99)
HCT VFR BLD CALC: 39 % (ref 36–46)
HEMOGLOBIN: 12.9 G/DL (ref 12–16)
IMMATURE GRANULOCYTES: ABNORMAL %
LYMPHOCYTES # BLD: 18 % (ref 15–40)
MCH RBC QN AUTO: 28.4 PG (ref 26–34)
MCHC RBC AUTO-ENTMCNC: 33 G/DL (ref 31–37)
MCV RBC AUTO: 86.1 FL (ref 80–100)
MONOCYTES # BLD: 8 % (ref 4–8)
NRBC AUTOMATED: ABNORMAL PER 100 WBC
PDW BLD-RTO: 14.7 % (ref 12.1–15.2)
PLATELET # BLD: 191 K/UL (ref 140–450)
PLATELET ESTIMATE: ABNORMAL
PMV BLD AUTO: ABNORMAL FL (ref 6–12)
POTASSIUM SERPL-SCNC: 3.9 MMOL/L (ref 3.7–5.3)
RBC # BLD: 4.53 M/UL (ref 4–5.2)
RBC # BLD: ABNORMAL 10*6/UL
SEG NEUTROPHILS: 72 % (ref 47–75)
SEGMENTED NEUTROPHILS ABSOLUTE COUNT: 8.3 K/UL (ref 2.5–7)
SODIUM BLD-SCNC: 139 MMOL/L (ref 135–144)
TROPONIN INTERP: ABNORMAL
TROPONIN T: ABNORMAL NG/ML
TROPONIN, HIGH SENSITIVITY: 16 NG/L (ref 0–14)
WBC # BLD: 11.4 K/UL (ref 3.5–11)
WBC # BLD: ABNORMAL 10*3/UL

## 2021-05-09 PROCEDURE — 6360000002 HC RX W HCPCS: Performed by: INTERNAL MEDICINE

## 2021-05-09 PROCEDURE — 36415 COLL VENOUS BLD VENIPUNCTURE: CPT

## 2021-05-09 PROCEDURE — 80048 BASIC METABOLIC PNL TOTAL CA: CPT

## 2021-05-09 PROCEDURE — 6370000000 HC RX 637 (ALT 250 FOR IP): Performed by: INTERNAL MEDICINE

## 2021-05-09 PROCEDURE — 1200000000 HC SEMI PRIVATE

## 2021-05-09 PROCEDURE — 84484 ASSAY OF TROPONIN QUANT: CPT

## 2021-05-09 PROCEDURE — 97162 PT EVAL MOD COMPLEX 30 MIN: CPT

## 2021-05-09 PROCEDURE — 85025 COMPLETE CBC W/AUTO DIFF WBC: CPT

## 2021-05-09 PROCEDURE — 2580000003 HC RX 258: Performed by: INTERNAL MEDICINE

## 2021-05-09 PROCEDURE — 93010 ELECTROCARDIOGRAM REPORT: CPT | Performed by: INTERNAL MEDICINE

## 2021-05-09 PROCEDURE — 51798 US URINE CAPACITY MEASURE: CPT

## 2021-05-09 PROCEDURE — 94761 N-INVAS EAR/PLS OXIMETRY MLT: CPT

## 2021-05-09 RX ORDER — TRAMADOL HYDROCHLORIDE 50 MG/1
50 TABLET ORAL EVERY 6 HOURS PRN
Status: DISCONTINUED | OUTPATIENT
Start: 2021-05-09 | End: 2021-05-12 | Stop reason: HOSPADM

## 2021-05-09 RX ORDER — LIDOCAINE 4 G/G
1 PATCH TOPICAL DAILY
Status: DISCONTINUED | OUTPATIENT
Start: 2021-05-09 | End: 2021-05-12 | Stop reason: HOSPADM

## 2021-05-09 RX ADMIN — LISINOPRIL 40 MG: 10 TABLET ORAL at 08:14

## 2021-05-09 RX ADMIN — ACETAMINOPHEN 650 MG: 325 TABLET, FILM COATED ORAL at 10:51

## 2021-05-09 RX ADMIN — FUROSEMIDE 40 MG: 20 TABLET ORAL at 16:55

## 2021-05-09 RX ADMIN — ACETAMINOPHEN 650 MG: 325 TABLET, FILM COATED ORAL at 16:55

## 2021-05-09 RX ADMIN — APIXABAN 5 MG: 5 TABLET, FILM COATED ORAL at 08:14

## 2021-05-09 RX ADMIN — SODIUM CHLORIDE, PRESERVATIVE FREE 10 ML: 5 INJECTION INTRAVENOUS at 22:05

## 2021-05-09 RX ADMIN — TRAZODONE HYDROCHLORIDE 100 MG: 50 TABLET ORAL at 20:58

## 2021-05-09 RX ADMIN — TRAMADOL HYDROCHLORIDE 50 MG: 50 TABLET, FILM COATED ORAL at 16:55

## 2021-05-09 RX ADMIN — TRAMADOL HYDROCHLORIDE 50 MG: 50 TABLET, FILM COATED ORAL at 10:51

## 2021-05-09 RX ADMIN — SPIRONOLACTONE 25 MG: 25 TABLET, FILM COATED ORAL at 08:14

## 2021-05-09 RX ADMIN — KETOROLAC TROMETHAMINE 15 MG: 30 INJECTION, SOLUTION INTRAMUSCULAR; INTRAVENOUS at 09:34

## 2021-05-09 RX ADMIN — LEVOFLOXACIN 500 MG: 5 INJECTION, SOLUTION INTRAVENOUS at 21:03

## 2021-05-09 RX ADMIN — ATORVASTATIN CALCIUM 10 MG: 20 TABLET, FILM COATED ORAL at 08:14

## 2021-05-09 RX ADMIN — FUROSEMIDE 40 MG: 20 TABLET ORAL at 08:14

## 2021-05-09 RX ADMIN — KETOROLAC TROMETHAMINE 15 MG: 30 INJECTION, SOLUTION INTRAMUSCULAR; INTRAVENOUS at 01:30

## 2021-05-09 RX ADMIN — KETOROLAC TROMETHAMINE 15 MG: 30 INJECTION, SOLUTION INTRAMUSCULAR; INTRAVENOUS at 22:04

## 2021-05-09 RX ADMIN — KETOROLAC TROMETHAMINE 15 MG: 30 INJECTION, SOLUTION INTRAMUSCULAR; INTRAVENOUS at 15:50

## 2021-05-09 RX ADMIN — CARVEDILOL 6.25 MG: 12.5 TABLET, FILM COATED ORAL at 20:43

## 2021-05-09 RX ADMIN — APIXABAN 5 MG: 5 TABLET, FILM COATED ORAL at 20:43

## 2021-05-09 RX ADMIN — CARVEDILOL 6.25 MG: 12.5 TABLET, FILM COATED ORAL at 08:14

## 2021-05-09 RX ADMIN — SPIRONOLACTONE 25 MG: 25 TABLET, FILM COATED ORAL at 20:43

## 2021-05-09 ASSESSMENT — PAIN SCALES - GENERAL
PAINLEVEL_OUTOF10: 8
PAINLEVEL_OUTOF10: 6
PAINLEVEL_OUTOF10: 4
PAINLEVEL_OUTOF10: 0
PAINLEVEL_OUTOF10: 5
PAINLEVEL_OUTOF10: 10
PAINLEVEL_OUTOF10: 0

## 2021-05-09 NOTE — H&P
History & Physical    Patient:  Francisco Cox  YOB: 1955  Date of Service: 5/9/2021  MRN: [id number]   Acct:   [de-identified]   Primary Care Physician: Audry Kussmaul, PA    Chief Complaint:   Chief Complaint   Patient presents with    Back Pain     lower mid back        History of Present Illness: The patient is a 77 y.o. female presented to the emergency room for evaluation of lower back pain. Apparently she fell last Monday on 5/3  and since then has been having significant pain in her lower back. The pain is constant, worse with movements, nonradiating. Apparently the patient was walking with her scooter and the scooter got caught on the furniture and she fell backwards landing on her back. She was evaluated in the emergency room on 5/3/2021 for acute back pain and fall. Imaging studies did not reveal acute abnormalities. She was treated with IV morphine and discharged home, recommended OTC pain medications and follow-up with PCP. She did not do well and her family brought her back to ER on 5/4 for evaluation. This time she was treated with IV Solu-Medrol, fentanyl and discharged home with [facility].  I spoke with patient's daughter Mia Garrett  this morning regarding patient's low back pain. She said the patient does not have a history of chronic low back pain and the problem is new. She states that they tried Tylenol and Norco at home for pain control and unfortunately did not help. The patient was not ambulating as before and her oral intake was also worsening. In addition, she became more confused and they had to bring her back to the emergency room yesterday for evaluation. Patient had no fevers, no chills, no chest pain, no shortness of breath, no nausea/vomiting, no diarrhea. Work-up in ER yesterday revealed no fever, blood pressure was 158/89, she was noted to be tachycardic and EKG revealed A. fib with RVR with heart rate of 128. Her troponin was 18.   She was treated with IV Cardizem and also oral Cardizem and her heart rate stabilized. Her BMP was unremarkable except elevated BUN of 28 and creatinine 0.92. Glucose was 311. CBC revealed elevated WBC count 16.6, hemoglobin 15.1, platelets 791. Urinalysis revealed 3+ leukocyte esterase, 20-50 WBCs, no bacteria. Due to persisting back pain and UTI with altered mental status the patient was deemed appropriate for admission  This morning she continues to complain of back pain, somewhat confused      Past Medical History:        Diagnosis Date    Atrial fibrillation ([address]     Cerebral artery occlusion with cerebral infarction ([address] LAST ONE 1990    X 2 NO DEFECITS    Difficult intravenous access     Hydrocephalus ([address] 08/2016    FORGETFUL AND SLEEPING ALOT    Hyperlipidemia 1996    ON RX    Hypertension 1996    ON RX    MRSA (methicillin resistant staph aureus) culture positive 07/02/2018    neck    Smoker     TIA (transient ischemic attack)     Wears glasses        Past Surgical History:        Procedure Laterality Date    KNEE SURGERY Right 2008    KNEE SURGERY Right     OVER 20 YRS AGO    OTHER SURGICAL HISTORY Right 12/14/2016     shunt     TUBAL LIGATION  1984    VENTRICULOPERITONEAL SHUNT Right 12/14/2016       Home Medications:   No current facility-administered medications on file prior to encounter.       Current Outpatient Medications on File Prior to Encounter   Medication Sig Dispense Refill    apixaban (ELIQUIS) 5 MG TABS tablet Take 5 mg by mouth 2 times daily      spironolactone (ALDACTONE) 25 MG tablet Take 25 mg by mouth 2 times daily      furosemide (LASIX) 40 MG tablet Take 40 mg by mouth 2 times daily      carvedilol (COREG) 6.25 MG tablet Take 6.25 mg by mouth 2 times daily      simvastatin (ZOCOR) 20 MG tablet Take 1 tablet by mouth nightly 30 tablet 3    lisinopril (PRINIVIL;ZESTRIL) 40 MG tablet Take 1 tablet by mouth daily 30 tablet 3       Allergies:  Sulfa antibiotics, Adhesive tape, and Penicillins    Social History:    reports that she has quit smoking. Her smoking use included cigarettes. She has a 40.00 pack-year smoking history. She has never used smokeless tobacco. She reports that she does not drink alcohol or use drugs. Family History:       Problem Relation Age of Onset    Diabetes Father         IDDM    High Blood Pressure Sister     Cancer Sister         UNKNOWN    Diabetes Maternal Grandmother         IDDM    High Blood Pressure Maternal Grandmother     High Blood Pressure Sister        Review of systems:    Unable to conduct review of systems due to patient's confusion      Vitals:   Vitals:    05/09/21 0936   BP: 140/88   Pulse: 88   Resp: 16   Temp: 98   SpO2: 93%      BMI: Body mass index is 25.61 kg/m². Physical Exam:    General Appearance: Awake, confused, repeatedly shouts \"I can'tt do it, please help me\".   In no acute distress  Cardiovascular: normal rate, regular rhythm, normal S1 and S2, no murmurs, rubs, clicks, or gallops, distal pulses intact  Pulmonary/Chest: clear to auscultation bilaterally-.no wheezes, rales or rhonchi, normal air movement, no respiratory distress  Abdomen: soft, non-tender, non-distended, normal bowel sounds  Extremities: no cyanosis, clubbing or edema  Skin: warm and dry, no rash or erythema  Head: normocephalic and atraumatic, oral mucosa moist  Eyes: pupils equal, round, and reactive to light  Neck: supple and non-tender without mass, no thyromegaly   Musculoskeletal: Tenderness in the thoracolumbar area bilaterally, back without deformities  Neurological:  normal speech, no focal findings or movement disorder noted    Review of Labs and Diagnostic Testing:    Recent Results (from the past 24 hour(s))   EKG 12 Lead    Collection Time: 05/08/21  5:27 PM   Result Value Ref Range    Ventricular Rate 128 BPM    Atrial Rate 125 BPM    QRS Duration 86 ms    Q-T Interval 346 ms    QTc Calculation (Bazett) 505 ms    R Axis 41 degrees    T Axis -141 degrees   APTT    Collection Time: 05/08/21  5:30 PM   Result Value Ref Range    PTT 25.9 23.9 - 33.8 sec   CBC Auto Differential    Collection Time: 05/08/21  5:30 PM   Result Value Ref Range    WBC 16.6 (H) 3.5 - 11.0 k/uL    RBC 5.26 (H) 4.0 - 5.2 m/uL    Hemoglobin 15.1 12.0 - 16.0 g/dL    Hematocrit 45.7 36 - 46 %    MCV 86.8 80 - 100 fL    MCH 28.8 26 - 34 pg    MCHC 33.1 31 - 37 g/dL    RDW 14.7 12.1 - 15.2 %    Platelets 573 886 - 568 k/uL    MPV NOT REPORTED 6.0 - 12.0 fL    NRBC Automated NOT REPORTED per 100 WBC    Differential Type YES     Seg Neutrophils 84 (H) 47 - 75 %    Lymphocytes 9 (L) 15 - 40 %    Monocytes 7 4 - 8 %    Eosinophils % 0 0 - 5 %    Basophils 0 0 - 2 %    Immature Granulocytes NOT REPORTED 0 %    Segs Absolute 14.00 (H) 2.5 - 7.0 k/uL    Absolute Lymph # 1.50 1.0 - 4.8 k/uL    Absolute Mono # 1.10 (H) 0.0 - 1.0 k/uL    Absolute Eos # 0.00 0.0 - 0.4 k/uL    Basophils Absolute 0.00 0.0 - 0.2 k/uL    Absolute Immature Granulocyte NOT REPORTED 0.00 - 0.30 k/uL    WBC Morphology NOT REPORTED     RBC Morphology NOT REPORTED     Platelet Estimate NOT REPORTED    Comprehensive Metabolic Panel    Collection Time: 05/08/21  5:30 PM   Result Value Ref Range    Glucose 211 (H) 70 - 99 mg/dL    BUN 28 (H) 8 - 23 mg/dL    CREATININE 0.92 (H) 0.50 - 0.90 mg/dL    Bun/Cre Ratio NOT REPORTED 9 - 20    Calcium 9.1 8.6 - 10.4 mg/dL    Sodium 138 135 - 144 mmol/L    Potassium 4.8 3.7 - 5.3 mmol/L    Chloride 100 98 - 107 mmol/L    CO2 24 20 - 31 mmol/L    Anion Gap 14 9 - 17 mmol/L    Alkaline Phosphatase 106 (H) 35 - 104 U/L    ALT 6 5 - 33 U/L    AST 16 <32 U/L    Total Bilirubin 1.35 (H) 0.30 - 1.20 mg/dL    Total Protein 7.1 6.4 - 8.3 g/dL    Albumin 4.0 3.5 - 5.2 g/dL    Albumin/Globulin Ratio NOT REPORTED 1.0 - 2.5    GFR Non-African American >60 >60 mL/min    GFR African American >60 >60 mL/min    GFR Comment          GFR Staging NOT REPORTED    Troponin    Collection Time: 05/08/21  5:30 PM   Result Value Ref Range    Troponin, High Sensitivity 18 (H) 0 - 14 ng/L    Troponin T NOT REPORTED <0.03 ng/mL    Troponin Interp NOT REPORTED    Protime-INR    Collection Time: 05/08/21  5:30 PM   Result Value Ref Range    Protime 14.9 (H) 11.5 - 14.2 sec    INR 1.2    Urinalysis    Collection Time: 05/08/21  6:12 PM   Result Value Ref Range    Color, UA YELLOW YELLOW    Turbidity UA CLEAR CLEAR    Glucose, Ur NEGATIVE NEGATIVE    Bilirubin Urine NEGATIVE NEGATIVE    Ketones, Urine TRACE (A) NEGATIVE    Specific Gravity, UA 1.020 1.005 - 1.030    Urine Hgb 1+ (A) NEGATIVE    pH, UA 6.0 5.0 - 8.0    Protein, UA 1+ (A) NEGATIVE    Urobilinogen, Urine Normal Normal    Nitrite, Urine NEGATIVE NEGATIVE    Leukocyte Esterase, Urine 3+ (A) NEGATIVE    Urinalysis Comments         Microscopic Urinalysis    Collection Time: 05/08/21  6:12 PM   Result Value Ref Range    -          WBC, UA 20 TO 50 0 /HPF    RBC, UA 5 TO 10 0 - 2 /HPF    Casts UA NOT REPORTED /LPF    Crystals, UA NOT REPORTED None /HPF    Epithelial Cells UA NOT REPORTED /HPF    Renal Epithelial, UA NOT REPORTED 0 /HPF    Bacteria, UA NOT REPORTED None    Mucus, UA NOT REPORTED None    Trichomonas, UA NOT REPORTED None    Amorphous, UA NOT REPORTED None    Other Observations UA NOT REPORTED NOT REQ. Yeast, UA NOT REPORTED None   Culture, Urine    Collection Time: 05/08/21  6:21 PM    Specimen: Urine, catheter   Result Value Ref Range    Specimen Description . URINE, CATHETERIZED     Special Requests NOT REPORTED     Culture PENDING    Lactate, Sepsis    Collection Time: 05/08/21  6:52 PM   Result Value Ref Range    Lactic Acid, Sepsis 1.8 0.5 - 1.9 mmol/L    Lactic Acid, Sepsis, Whole Blood NOT REPORTED 0.5 - 1.9 mmol/L   COVID-19, Rapid    Collection Time: 05/08/21  7:45 PM    Specimen: Nasopharyngeal Swab   Result Value Ref Range    Specimen Description . NASOPHARYNGEAL SWAB     SARS-CoV-2, Rapid Not Detected Not Detected   Basic Metabolic Panel w/ Reflex to MG    Collection Time: 05/09/21  5:16 AM   Result Value Ref Range    Glucose 120 (H) 70 - 99 mg/dL    BUN 27 (H) 8 - 23 mg/dL    CREATININE 0.96 (H) 0.50 - 0.90 mg/dL    Bun/Cre Ratio 28 (H) 9 - 20    Calcium 8.2 (L) 8.6 - 10.4 mg/dL    Sodium 139 135 - 144 mmol/L    Potassium 3.9 3.7 - 5.3 mmol/L    Chloride 106 98 - 107 mmol/L    CO2 26 20 - 31 mmol/L    Anion Gap 7 (L) 9 - 17 mmol/L    GFR Non-African American 58 (L) >60 mL/min    GFR African American >60 >60 mL/min    GFR Comment          GFR Staging NOT REPORTED    CBC auto differential    Collection Time: 05/09/21  5:16 AM   Result Value Ref Range    WBC 11.4 (H) 3.5 - 11.0 k/uL    RBC 4.53 4.0 - 5.2 m/uL    Hemoglobin 12.9 12.0 - 16.0 g/dL    Hematocrit 39.0 36 - 46 %    MCV 86.1 80 - 100 fL    MCH 28.4 26 - 34 pg    MCHC 33.0 31 - 37 g/dL    RDW 14.7 12.1 - 15.2 %    Platelets 500 450 - 350 k/uL    MPV NOT REPORTED 6.0 - 12.0 fL    NRBC Automated NOT REPORTED per 100 WBC    Differential Type YES     Seg Neutrophils 72 47 - 75 %    Lymphocytes 18 15 - 40 %    Monocytes 8 4 - 8 %    Eosinophils % 1 0 - 5 %    Basophils 1 0 - 2 %    Immature Granulocytes NOT REPORTED 0 %    Segs Absolute 8.30 (H) 2.5 - 7.0 k/uL    Absolute Lymph # 2.00 1.0 - 4.8 k/uL    Absolute Mono # 0.90 0.0 - 1.0 k/uL    Absolute Eos # 0.10 0.0 - 0.4 k/uL    Basophils Absolute 0.10 0.0 - 0.2 k/uL    Absolute Immature Granulocyte NOT REPORTED 0.00 - 0.30 k/uL    WBC Morphology NOT REPORTED     RBC Morphology NOT REPORTED     Platelet Estimate NOT REPORTED        Radiology:     Xr Lumbar Spine (min 4 Views)    Result Date: 5/8/2021  EXAM: XR LUMBAR SPINE (MIN 4 VIEWS) HISTORY: The patient is a 47-year-old female with low back pain. COMPARISON: CT scan of the lumbar spine from 5/3/2021. FINDINGS: There is no radiographic evidence of fracture or loss of vertebral body height throughout the lumbar spine. The lateral view demonstrates moderate narrowing of the L4-L5 disc.  The widths of the other discs relatively maintained. There is no spondylolisthesis. The AP view demonstrates mild levoscoliosis throughout the lumbar spine. The oblique views demonstrate no evidence of spondylolysis. The sacroiliac joints are maintained. Incidentally noted are advanced osteoarthritic changes within the right hip joint. 1. 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 2. Osteoarthritis of the right hip. Ct Head Wo Contrast    Result Date: 5/8/2021  EXAMINATION: CT HEAD WO CONTRAST HISTORY: Reason for exam:->Confusion COMPARISON: None. TECHNIQUE: CT examination of the head without IV contrast. Dose reduction techniques were achieved by using automated exposure control and/or adjustment of mA and/or kV according to patient size and/or use of iterative reconstruction technique. FINDINGS: Motion artifact. Right posterior parietal approach ventriculostomy catheter terminates at the posterior body left lateral ventricle. The lateral ventricles are moderately dilated, the third and fourth ventricles are no significant abnormality. There is widespread white matter microangiopathy change. There is no acute infarct or hemorrhage or mass effect. Sinuses and mastoids are clear. Orbits appear unremarkable. Moderate dilatation of lateral ventricles. Shunt catheter in place. No acute findings otherwise. Assessment/ Plan:    1 .UTI  - on IV Levaquin, urine culture pending   2. Altered mental status - possibly secondary to UTI, CT scan of head without contrast revealed moderate dilatation of lateral ventricles, shunt catheter in place, otherwise no acute findings. 3.  A. fib with RVR  - patient with history of atrial fibrillation. Heart rate improved after IV Cardizem. She is anticoagulated with Eliquis. She is on  Coreg. Monitor on telemetry. Will repeat troponin level for follow-up  4. Acute low back pain -patient is s/p fall on 5/3.   CT thoracic and lumbar spine did not show any acute fractures, revealed ankylosing spondylitis of thoracic spine. Will treat her back pain with prn tramadol, Toradol, lidocaine patch. Consult PT and OT for evaluation. 5.  History of NPH, s/p right  shunt 2016  6. Hypertension - blood pressure is stable, continue on lisinopril, Coreg, diuretics  7. Hyperglycemia -most likely due to IV steroids that she received in ER on 5/4. Will check hemoglobin A1c to make sure she does not have diabetes        CODE STATUS full        Advanced Care Plan   (x )  I confirmed that the patient's Advanced Care Plan is present, code status documented, or surrogate decision maker is listed in the patient's medical record. ( )  The patient's advanced care plan is not present because:  (select)   ( ) I confirmed today that the patient does not wish or was not able to name a surrogate decision maker or provide an [address] ( ) Hospice care is currently being provided or has been provided this calender year. ( )  I did not confirm today the presence of an [address] or surrogate decision maker documented within the patient's medical record. (Does not satisfy MIPS performance). Documentation of Current Medications in the Medical Record   (x )  I have utilized all available immediate resources to obtain, update, or review the patient's current medications. If Yes, Stop Here  ( ) The patient is not eligible for medications reconciliation; the patient is in an emergent medical situation where delaying treatment would jeopardize the patient's health. ( ) I did not confirm, update or review the patient's current list of medications today. (does not satisfy MIPS performance)        Medical Necessity: Inpatient admission is appropriate for this patient secondary to the need of IV antibiotics, IV fluids, PT and OT evaluation  Estimated length of stay: 2 days.     The beneficiary may reasonably be expected to be discharged or transferred to a hospital within 96 hours after admission.     DVT prophylaxis:   [] Lovenox   [] SCDs   [] SQ Heparin   [] Encourage ambulation, low risk for DVT, no chemical or mechanical    prophylaxis necessary      [x] Already on Anticoagulation    Anticipated Disposition upon discharge:   [] Home   [] Home with Home Health   [x] Gary Chandu   [] [address]      Electronically signed by Alea Robbins MD on 5/9/2021 at 10:25 AM

## 2021-05-09 NOTE — PLAN OF CARE
[facility]  Inpatient Physical Therapy  Plan of Care  Date: 2021  Patient Name: Travon Severino        : 1955  (82 y.o.)  Referring Practitioner: Dr. Susana Thao  Admission Date: 2021  Referral Date : 21  Diagnosis: UTI  Treatment Diagnosis: Weakness due to UTI  PT Orders Received and Evaluation Complete  Identified Problem Areas: Body structures, Functions, Activity limitations: Decreased functional mobility , Decreased ADL status, Decreased balance, Decreased high-level IADLs, Increased pain, Decreased posture, Decreased cognition, Decreased endurance, Decreased safe awareness  Prognosis: Poor   Will attempt PT services for 3 days, if pt continues to refuse to participate will D/C. Pt to benefit from skilled placement due to poor mobility bryan and decreased ability to care for herself. Justification for Skilled Services:  [x] Reduce Falls   [x] Improve Ambulation  [x]  Complete Daily Tasks Safely   [x] Improve Balance   [x] Improve LE strength  [x]  Return to Prior Level of Function  [x] Improve Functional Mobility   [x]  Family/Caregiver Education  [x] Patient Education: [x]Assistive Devices [x]Home Exercise Program and Progression    Plan  Times per week: daily  Times per day: (1-2x/day -Tues)  [] Modalities:  [x] Therapeutic Exercise   [x] Gait Training  [x] Therapeutic Activity    [] Home Safety Evaluation         [] Massage                        [] Neuromuscular Re-education [] Back Education             [x] Patient Education [x] Home Exercise Program       Rehab Potential:  []  Good [] Fair   [x]  Poor    Goals  Short term goals  Time Frame for Short term goals: 3days(POC exp 21)  Short term goal 1: Pt to complete supine to sit SBA to improve independence of transfers. Short term goal 2: Pt to complete stand pivot transfer Shae to improve toileting. Short term goal 3: Pt to amb 10ft with ww to improve access to bathing/bathroom.     Rani NEGRO Audelia, PT   Date: 5/9/2021

## 2021-05-09 NOTE — PROGRESS NOTES
Patient denies needing to void. Brief remains dry since beginning of this nurse's shift. Nightshift nurse reports void prior to shift change. Bladder scan 309 mL. Dr. Nicolás Vargas notified. To continue to monitor at this point.

## 2021-05-09 NOTE — PROGRESS NOTES
Pt arrives to floor via cart. Pt states pain is 8/10 in back. Pt is very intolerant of movement due to pain. Pt moved to bed and repositioned. Vitals, assessment, and room orientation completed. Pt is unable to identify current year or month- oriented to person and place. Bed alarm on.

## 2021-05-09 NOTE — PROGRESS NOTES
Called Dr. Ryley Morrow and notified pt has been calling out in pain and unable to sleep. Notified Dr. Ryley Morrow of toradol shot in ER and that is ordered every 6 hours. Per Dr. Ryley Morrow give pt trazodone 100 mg prn at bedtime for insomnia and 50 mg tramadol x1. Orders placed.

## 2021-05-09 NOTE — PROGRESS NOTES
Alice Members daughter called in for update on patient. Updated on patient status. Per daughter pt calls out a lot at night about her back. She also states that pt will not tell you if she is hungry and you have to check with her and remind her to eat. Daughter also states pt has really eaten much since Monday when she fell.

## 2021-05-09 NOTE — ED PROVIDER NOTES
eMERGENCY dEPARTMENT eNCOUnter        [address] [facility]    Chief Complaint   Patient presents with    Back Pain     lower mid back        HPI    Julio Su is a 77 y.o. female who presents with low back pain which began when she fell 5 days ago. Pain is described as being moderate in severity. Pain is worse with motion. Patient has also been experiencing recent confusion. REVIEW OF SYSTEMS    All systems reviewed and positives are in the HPI.     PAST MEDICAL HISTORY    Past Medical History:   Diagnosis Date    Atrial fibrillation ([address]     Cerebral artery occlusion with cerebral infarction ([address] LAST ONE 1990    X 2 NO DEFECITS    Difficult intravenous access     Hydrocephalus ([address] 08/2016    FORGETFUL AND SLEEPING ALOT    Hyperlipidemia 1996    ON RX    Hypertension 1996    ON RX    MRSA (methicillin resistant staph aureus) culture positive 07/02/2018    neck    Smoker     TIA (transient ischemic attack)     Wears glasses        SURGICAL HISTORY    Past Surgical History:   Procedure Laterality Date    KNEE SURGERY Right 2008    KNEE SURGERY Right     OVER 20 YRS AGO    OTHER SURGICAL HISTORY Right 12/14/2016     shunt     TUBAL LIGATION  1984    VENTRICULOPERITONEAL SHUNT Right 12/14/2016       CURRENT MEDICATIONS    Current Outpatient Rx   Medication Sig Dispense Refill    nitrofurantoin, macrocrystal-monohydrate, (MACROBID) 100 MG capsule Take 1 capsule by mouth 2 times daily for 14 days 28 capsule 0    dilTIAZem (CARDIZEM CD) 120 MG extended release capsule Take 1 capsule by mouth daily 30 capsule 0       ALLERGIES    Allergies   Allergen Reactions    Sulfa Antibiotics Hives    Adhesive Tape Other (See Comments)     Skin tears    Penicillins Rash     Per patient 12/7/19       FAMILY HISTORY    Family History   Problem Relation Age of Onset    Diabetes Father         IDDM    High Blood Pressure Sister     Cancer Sister         UNKNOWN    Diabetes Maternal Grandmother Soft, nondistended, nontender, no organomegaly, no mass, no rebound, no guarding   :  No costovertebral angle tenderness   Musculoskeletal:  No edema, no tenderness, no deformities. Back-lumbar tenderness bilaterally left worse than right. Pain with flexion extension. Decreased range of motion due to pain. Integument: Dry with poor skin turgor. Neurologic: Grossly intact    EKG    Atrial fibrillation with rapid ventricular response    RADIOLOGY/PROCEDURES      X-rays of lumbar spine revealed no fracture or loss of vertebral body height throughout the lumbar spine. There was degenerative disc disease of the lower lumbar spine. Patient also had osteoarthritis of the right hip. CT of the head revealed moderate dilatation of the lateral ventricles with a shunt catheter in place. No acute findings otherwise. ED COURSE & MEDICAL DECISION MAKING    Pertinent Labs & Imaging studies reviewed. (See chart for details)  Patient received Cardizem 15 mg IV due to atrial fibrillation with a rapid ventricular response. He had a a very good response to this treatment. This controlled her rate very well. Patient was given Macrobid in the ER for UTI. She was also given Cardizem  mg p.o.. Initially I was going to discharge the patient, however her family informed me that the patient has had difficulty taking her oral medications recently, and her pain has been difficult to control. Patient did receive Toradol 15 mg IV in the ER. She was still complaining of pain just prior to planned discharge. Due to this it was felt that it would be best for the patient if she was admitted to the hospital.  I spoke with Dr. Evita Ferrer, and patient will be admitted to Dr. Deacon Artis service.    Labs Reviewed   CBC WITH AUTO DIFFERENTIAL - Abnormal; Notable for the following components:       Result Value    WBC 16.6 (*)     RBC 5.26 (*)     Seg Neutrophils 84 (*)     Lymphocytes 9 (*)     Segs Absolute 14.00 (*)     Absolute portions of this note were completed with a voice recognition program.  Efforts were made to edit the dictations but occasionally words are mis-transcribed.)     Doris Pinto MD  05/08/21 [id number]

## 2021-05-09 NOTE — PROGRESS NOTES
Pt has not voided since coming to floor. Brief has been clean and dry at every check. Pt has denied needing to use the bathroom with every offer. Notified pt going to do bladder scan to check urine in bladder. Pt states \"my bladder is fine, leave me alone. \" PVR  Performed and showing 353 mL in bladder. Will continue to monitor.

## 2021-05-09 NOTE — PROGRESS NOTES
Patient yelling out since waking, confused. C/o back pain, repeats \"help me. \" This nurse at bedside with 1:1, unable to console. PRN Toradol administered.

## 2021-05-09 NOTE — PROGRESS NOTES
with MAX VCs)  Sit to Supine: Minimal assistance(for LEs)  Scooting: Stand by assistance(MAX VCs to bridge)     Balance  Posture: Poor(increased kyphosis)  Sitting - Static: Fair, +  Sitting - Dynamic: Fair, +    Assessment  Activity Tolerance: Patient limited by cognitive status   Body structures, Functions, Activity limitations: Decreased functional mobility , Decreased ADL status, Decreased balance, Decreased high-level IADLs, Increased pain, Decreased posture, Decreased cognition, Decreased endurance, Decreased safe awareness  Chart Reviewed: Yes  Assessment: Pt Shae+1-2 for supine to sit but MAX VCs to participate from both PT and nursing. Pt frequently argues and yells out \"I can't do it\" \"Leave me alone\"  PLOF unable to be attained by pt due to current mentation. Per review of notes, family stated pt was transferring to scooter and that is her primary mobility, but unclear if this information is accurate. Will attempt PT services for 3 days, if pt continues to refuse to participate will D/C. Pt to benefit from skilled placement due to poor mobility bryan and decreased ability to care for herself. Prognosis: Poor  Discharge Recommendations: ECF with PT, ECF without PT           Plan  Times per week: daily  Times per day: (1-2x/day M-Tues)    Goals  Short term goals  Time Frame for Short term goals: 3days(POC exp 5/11/21)  Short term goal 1: Pt to complete supine to sit SBA to improve independence of transfers. Short term goal 2: Pt to complete stand pivot transfer Shae to improve toileting. Short term goal 3: Pt to amb 10ft with ww to improve access to bathing/bathroom.     Time In: 1150  Time Out: 1212  Timed Coded Minutes: 0  Total Treatment Time: [address]rae, PT 5/9/2021

## 2021-05-10 LAB
ABSOLUTE EOS #: 0.2 K/UL (ref 0–0.4)
ABSOLUTE IMMATURE GRANULOCYTE: ABNORMAL K/UL (ref 0–0.3)
ABSOLUTE LYMPH #: 1.6 K/UL (ref 1–4.8)
ABSOLUTE MONO #: 0.8 K/UL (ref 0–1)
ANION GAP SERPL CALCULATED.3IONS-SCNC: 11 MMOL/L (ref 9–17)
BASOPHILS # BLD: 0 % (ref 0–2)
BASOPHILS ABSOLUTE: 0.1 K/UL (ref 0–0.2)
BUN BLDV-MCNC: 25 MG/DL (ref 8–23)
BUN/CREAT BLD: 24 (ref 9–20)
CALCIUM SERPL-MCNC: 8.2 MG/DL (ref 8.6–10.4)
CHLORIDE BLD-SCNC: 107 MMOL/L (ref 98–107)
CO2: 22 MMOL/L (ref 20–31)
CREAT SERPL-MCNC: 1.05 MG/DL (ref 0.5–0.9)
DIFFERENTIAL TYPE: YES
EOSINOPHILS RELATIVE PERCENT: 2 % (ref 0–5)
ESTIMATED AVERAGE GLUCOSE: 134 MG/DL
GFR AFRICAN AMERICAN: >60 ML/MIN
GFR NON-AFRICAN AMERICAN: 52 ML/MIN
GFR SERPL CREATININE-BSD FRML MDRD: ABNORMAL ML/MIN/{1.73_M2}
GFR SERPL CREATININE-BSD FRML MDRD: ABNORMAL ML/MIN/{1.73_M2}
GLUCOSE BLD-MCNC: 137 MG/DL (ref 70–99)
HBA1C MFR BLD: 6.3 % (ref 4–6)
HCT VFR BLD CALC: 40.2 % (ref 36–46)
HEMOGLOBIN: 13.2 G/DL (ref 12–16)
IMMATURE GRANULOCYTES: ABNORMAL %
LYMPHOCYTES # BLD: 13 % (ref 15–40)
MCH RBC QN AUTO: 28.6 PG (ref 26–34)
MCHC RBC AUTO-ENTMCNC: 32.8 G/DL (ref 31–37)
MCV RBC AUTO: 87.3 FL (ref 80–100)
MONOCYTES # BLD: 6 % (ref 4–8)
NRBC AUTOMATED: ABNORMAL PER 100 WBC
PDW BLD-RTO: 14.5 % (ref 12.1–15.2)
PLATELET # BLD: 206 K/UL (ref 140–450)
PLATELET ESTIMATE: ABNORMAL
PMV BLD AUTO: ABNORMAL FL (ref 6–12)
POTASSIUM SERPL-SCNC: 3.8 MMOL/L (ref 3.7–5.3)
RBC # BLD: 4.61 M/UL (ref 4–5.2)
RBC # BLD: ABNORMAL 10*6/UL
SEG NEUTROPHILS: 79 % (ref 47–75)
SEGMENTED NEUTROPHILS ABSOLUTE COUNT: 9.4 K/UL (ref 2.5–7)
SODIUM BLD-SCNC: 140 MMOL/L (ref 135–144)
WBC # BLD: 12 K/UL (ref 3.5–11)
WBC # BLD: ABNORMAL 10*3/UL

## 2021-05-10 PROCEDURE — 6370000000 HC RX 637 (ALT 250 FOR IP): Performed by: INTERNAL MEDICINE

## 2021-05-10 PROCEDURE — 36415 COLL VENOUS BLD VENIPUNCTURE: CPT

## 2021-05-10 PROCEDURE — 85025 COMPLETE CBC W/AUTO DIFF WBC: CPT

## 2021-05-10 PROCEDURE — 94761 N-INVAS EAR/PLS OXIMETRY MLT: CPT

## 2021-05-10 PROCEDURE — 6360000002 HC RX W HCPCS: Performed by: INTERNAL MEDICINE

## 2021-05-10 PROCEDURE — 1200000000 HC SEMI PRIVATE

## 2021-05-10 PROCEDURE — 80048 BASIC METABOLIC PNL TOTAL CA: CPT

## 2021-05-10 PROCEDURE — 2580000003 HC RX 258: Performed by: INTERNAL MEDICINE

## 2021-05-10 RX ORDER — TRAZODONE HYDROCHLORIDE 50 MG/1
50 TABLET ORAL NIGHTLY
Status: DISCONTINUED | OUTPATIENT
Start: 2021-05-10 | End: 2021-05-12 | Stop reason: HOSPADM

## 2021-05-10 RX ORDER — LEVOFLOXACIN 5 MG/ML
250 INJECTION, SOLUTION INTRAVENOUS EVERY 24 HOURS
Status: DISCONTINUED | OUTPATIENT
Start: 2021-05-10 | End: 2021-05-12 | Stop reason: HOSPADM

## 2021-05-10 RX ADMIN — LEVOFLOXACIN 250 MG: 5 INJECTION, SOLUTION INTRAVENOUS at 21:26

## 2021-05-10 RX ADMIN — ATORVASTATIN CALCIUM 10 MG: 20 TABLET, FILM COATED ORAL at 12:02

## 2021-05-10 RX ADMIN — ONDANSETRON 4 MG: 2 INJECTION INTRAMUSCULAR; INTRAVENOUS at 03:05

## 2021-05-10 RX ADMIN — APIXABAN 5 MG: 5 TABLET, FILM COATED ORAL at 21:24

## 2021-05-10 RX ADMIN — FUROSEMIDE 40 MG: 20 TABLET ORAL at 12:02

## 2021-05-10 RX ADMIN — TRAMADOL HYDROCHLORIDE 50 MG: 50 TABLET, FILM COATED ORAL at 03:17

## 2021-05-10 RX ADMIN — TRAMADOL HYDROCHLORIDE 50 MG: 50 TABLET, FILM COATED ORAL at 23:16

## 2021-05-10 RX ADMIN — ACETAMINOPHEN 650 MG: 325 TABLET, FILM COATED ORAL at 23:16

## 2021-05-10 RX ADMIN — LISINOPRIL 40 MG: 10 TABLET ORAL at 12:02

## 2021-05-10 RX ADMIN — SODIUM CHLORIDE: 9 INJECTION, SOLUTION INTRAVENOUS at 13:14

## 2021-05-10 RX ADMIN — APIXABAN 5 MG: 5 TABLET, FILM COATED ORAL at 12:03

## 2021-05-10 RX ADMIN — KETOROLAC TROMETHAMINE 15 MG: 30 INJECTION, SOLUTION INTRAMUSCULAR; INTRAVENOUS at 05:51

## 2021-05-10 RX ADMIN — CARVEDILOL 6.25 MG: 12.5 TABLET, FILM COATED ORAL at 12:02

## 2021-05-10 RX ADMIN — ACETAMINOPHEN 650 MG: 325 TABLET, FILM COATED ORAL at 03:17

## 2021-05-10 RX ADMIN — TRAMADOL HYDROCHLORIDE 50 MG: 50 TABLET, FILM COATED ORAL at 18:00

## 2021-05-10 RX ADMIN — SPIRONOLACTONE 25 MG: 25 TABLET, FILM COATED ORAL at 21:25

## 2021-05-10 RX ADMIN — CARVEDILOL 6.25 MG: 12.5 TABLET, FILM COATED ORAL at 21:25

## 2021-05-10 RX ADMIN — ACETAMINOPHEN 650 MG: 325 TABLET, FILM COATED ORAL at 12:02

## 2021-05-10 RX ADMIN — KETOROLAC TROMETHAMINE 15 MG: 30 INJECTION, SOLUTION INTRAMUSCULAR; INTRAVENOUS at 13:09

## 2021-05-10 RX ADMIN — TRAZODONE HYDROCHLORIDE 50 MG: 50 TABLET ORAL at 21:25

## 2021-05-10 RX ADMIN — SODIUM CHLORIDE: 9 INJECTION, SOLUTION INTRAVENOUS at 01:00

## 2021-05-10 RX ADMIN — SPIRONOLACTONE 25 MG: 25 TABLET, FILM COATED ORAL at 12:02

## 2021-05-10 RX ADMIN — ACETAMINOPHEN 650 MG: 325 TABLET, FILM COATED ORAL at 18:00

## 2021-05-10 RX ADMIN — TRAMADOL HYDROCHLORIDE 50 MG: 50 TABLET, FILM COATED ORAL at 12:02

## 2021-05-10 RX ADMIN — SODIUM CHLORIDE, PRESERVATIVE FREE 10 ML: 5 INJECTION INTRAVENOUS at 03:05

## 2021-05-10 ASSESSMENT — PAIN SCALES - GENERAL
PAINLEVEL_OUTOF10: 4
PAINLEVEL_OUTOF10: 4
PAINLEVEL_OUTOF10: 0
PAINLEVEL_OUTOF10: 8
PAINLEVEL_OUTOF10: 0
PAINLEVEL_OUTOF10: 2
PAINLEVEL_OUTOF10: 4
PAINLEVEL_OUTOF10: 4

## 2021-05-10 ASSESSMENT — PAIN DESCRIPTION - ORIENTATION: ORIENTATION: MID;LOWER

## 2021-05-10 ASSESSMENT — PAIN DESCRIPTION - LOCATION: LOCATION: BACK

## 2021-05-10 NOTE — PROGRESS NOTES
Pt continues to sleep in bed. Awakens easily to voice. Pt able to tell nurse her name and that she is in the hospital but does not know which hospital or why. Pt refuses any breakfast or to take pills at this time. State \"I just need to rest.\" Does allow nurse to check brief which is clean and dry at this time. Call light in reach and bed alarm on.

## 2021-05-10 NOTE — PROGRESS NOTES
Pt continues to yell out in pain, states \"I can't do it. My back hurts and I'm wet. \" Attends changed and veronica care done. Pt continues to c/o 4/10 mid-lower back pain. Medicated with IV Toradol as ordered (see eMAR). Pt refuses to be repositioned in bed at this time. Bed alarm on and call light in reach.

## 2021-05-10 NOTE — PROGRESS NOTES
05/10/2021    BUN 25 (H) 05/10/2021    CREATININE 1.05 (H) 05/10/2021    GLUCOSE 137 (H) 05/10/2021    CALCIUM 8.2 (L) 05/10/2021    PROT 7.1 05/08/2021    LABALBU 4.0 05/08/2021    BILITOT 1.35 (H) 05/08/2021    ALKPHOS 106 (H) 05/08/2021    AST 16 05/08/2021    ALT 6 05/08/2021    LABGLOM 52 (L) 05/10/2021    GFRAA >60 05/10/2021     Lab Results   Component Value Date    LABA1C 6.3 (H) 08/12/2016     Lab Results   Component Value Date     08/12/2016     No results found for: VITD25    Nutrition Interventions:   Food and/or Nutrient Delivery:  Continue Current Diet  Nutrition Education/Counseling:  Education not appropriate   Coordination of Nutrition Care:  Continue to monitor while inpatient    Goals:  PO >75% meals       Nutrition Monitoring and Evaluation:   Behavioral-Environmental Outcomes:  Knowledge or Skill   Food/Nutrient Intake Outcomes:  Food and Nutrient Intake  Physical Signs/Symptoms Outcomes:  Biochemical Data, Chewing or Swallowing, Weight     Discharge Planning:     Too soon to determine     Electronically signed by Santos Du RD, LD on 5/10/21 at 8:29 AM EDT    Contact: [phone]

## 2021-05-10 NOTE — PROGRESS NOTES
Patient verbally calls out for help. This RN checks on patient, pt c/o needing to vomit and in pain. PRN Zofran given at this time and emesis bag provided. Brief heavily saturated with urine- veronica care and new brief provided. Patient is very verbally aggressive along with mild physical aggression toward 2 RNs providing care. Patient is indecisive of answering questions pertaining care and resisting care. Patient continues to call out frequently regardless of nursing attention. Will continue to monitor.

## 2021-05-10 NOTE — PROGRESS NOTES
ALMAS completed assessment by phone with dtr Tea this morning during quality rounds with RN case manager. Pt is sleeping and nursing reports has confusion and is combative at times when awoken. Pt is a 77year old female admitted for UTI. Pt lives with her dtr [facility] and her son in law and grandchildren in their home in [place]. Pt uses a walker to get around at home. Pt also has a BSC and nebulizer available at home. Pt was not receiving any community services prior to admission. Pt's family transports her to appointments when they can get her to go. Pt is a full code and follows with Mary Goodman CNP as PCP. Pt does not have advance directives and is not able to complete these in her current state. ACP note completed with Tea as best as possible and she reports that her sister Cj Faith is older and then her. [facility] states that she has 2 brothers but they are in prison. Tea reports that her medications have been affordable. Discussed discharge planning with Tea and she was waiting to see how pt did with therapy but she has refused to work with them this morning and has been sleeping. [facility] states that she will come out later and see if she can get her to work with them. [facility] states that she would like for her to come home at discharge as pt did not want to go to the nursing home last time she was in. ALMAS will continue to follow and remain available as needed.  Smith JACKSON 5/10/2021

## 2021-05-10 NOTE — PLAN OF CARE
Problem: Skin Integrity:  Goal: Will show no infection signs and symptoms  Description: Will show no infection signs and symptoms  5/10/2021 1109 by Elias Hicks RN  Outcome: Ongoing     Problem: Pain:  Goal: Control of acute pain  Description: Control of acute pain  5/10/2021 1109 by Elias Hicks RN  Outcome: Ongoing     Problem: Sensory:  Goal: General experience of comfort will improve  Description: General experience of comfort will improve  5/10/2021 1109 by Elias Hicks RN  Outcome: Ongoing     Problem: Urinary Elimination:  Goal: Signs and symptoms of infection will decrease  Description: Signs and symptoms of infection will decrease  Outcome: Ongoing     Problem: Safety:  Goal: Free from accidental physical injury  Description: Free from accidental physical injury  Outcome: Ongoing

## 2021-05-10 NOTE — PROGRESS NOTES
Hospitalist Progress Note  5/10/2021 5:45 AM  Subjective:   Admit Date: 5/8/2021  PCP: JAYLAN Guan    Interval History: Izabel Nathan is alert this am moaning out. At first she denies any pain but complains of back pain before I leave the room. She denies chest pain or SOB. There was some nausea last night requiring Zofran. Labs reviewed this am.      Diet: DIET GENERAL;  Medications:   Scheduled Meds:   lidocaine  1 patch Transdermal Daily    nitrofurantoin (macrocrystal-monohydrate)  100 mg Oral Once    apixaban  5 mg Oral BID    carvedilol  6.25 mg Oral BID    furosemide  40 mg Oral BID    lisinopril  40 mg Oral Daily    atorvastatin  10 mg Oral Daily    spironolactone  25 mg Oral BID    sodium chloride flush  5-40 mL Intravenous 2 times per day    levofloxacin  500 mg Intravenous Q24H     Continuous Infusions:   sodium chloride      sodium chloride 75 mL/hr at 05/10/21 0100       Patient's current medications documented, reviewed, and updated. CBC:   Recent Labs     05/08/21 1730 05/09/21  0516 05/10/21  0508   WBC 16.6* 11.4* 12.0*   HGB 15.1 12.9 13.2    191 206     BMP:    Recent Labs     05/08/21  1730 05/09/21  0516 05/10/21  0508    139 140   K 4.8 3.9 3.8    106 107   CO2 24 26 22   BUN 28* 27* 25*   CREATININE 0.92* 0.96* 1.05*   GLUCOSE 211* 120* 137*     Hepatic:   Recent Labs     05/08/21  1730   AST 16   ALT 6   BILITOT 1.35*   ALKPHOS 106*     Troponin: No results for input(s): TROPONINI in the last 72 hours. BNP: No results for input(s): BNP in the last 72 hours. Lipids: No results for input(s): CHOL, HDL in the last 72 hours.     Invalid input(s): LDLCALCU  INR:   Recent Labs     05/08/21  1730   INR 1.2         Objective:   Vitals: BP (!) 125/54   Pulse 72   Temp 97.6 °F (36.4 °C) (Oral)   Resp 18   Ht 5' 2\" (1.575 m)   Wt 143 lb 3.2 oz (65 kg)   LMP 12/13/1990 (Approximate)   SpO2 96%   BMI 26.19 kg/m²   General appearance: alert and cooperative hydrocephalus (HCC)     Altered mental status     Abnormal head CT     Hypertensive emergency     Urinary tract infection without hematuria     Abnormal MRI     Cerebrovascular disease     Closed fracture of single pubic ramus of pelvis, right, initial encounter (HCC)     UTI (urinary tract infection)      Documentation of the Current Medications in the Medical Record    (x)  I have utilized all available immediate resources to obtain, update, or review the patient's current medications. (Satisfies MIPS Performance)  If Yes, Stop Here  ( )  The patient is not eligible for medication reconciliation; the patient is in an emergent medical situation where delaying treatment would jeopardize the patient's health. (MIPS Performance exception / exclusion)  ( )  I did not confirm, update or review the patient's current list of medications today. (Does not satisfy MIPS Performance)      Advanced Care Plan    (x)  I confirmed that the patient's Advanced Care Plan is present, code status documented, or surrogate decision maker is listed in the patient's medical record. ( )  The patient's advanced care plan is not present because:  (select)   ( ) I confirmed today that the patient does not wish or was not able to name a surrogate decision maker or provide an [address] ( ) Hospice care is currently being provided or has been provided this calender year. ( )  I did not confirm today the presence of an [address] or surrogate decision maker documented within the patient's medical record. (Does not satisfy MIPS performance). Documentation of Current Medications in the Medical Record    (x)  I have utilized all available immediate resources to obtain, update, or review the patient's current medications. If Yes, Stop Here  ( ) The patient is not eligivel for medications reconciliation; the patient is in an emergent medical situation where delaying treatment would jeopardize the patient's health.   ( ) I did not confirm, update or review the patient's current list of medications today.   (does not satisfy MIPS performance)      Loel Koyanagi, MD  Rounding Hospitalist

## 2021-05-10 NOTE — PROGRESS NOTES
Quality flow rounds held on 5/10/21     Isela Forrester is admitted for UTI. Length of stay 2. Education:    Needed Education: UTI, meds, follow up,diet      Do you have any questions regarding your plan of care while at the hospital? denies    Planned Disposition:               [x]  Home when able with Dario Lacy if needed. [] Swing Bed                [] ECF/SNF               [] Other/TBD    Barriers to Discharge:    Can you afford your medications? yes   Do you have transportation to follow up appointments? Family drives   Do you need any new equipment at home? denies   Current equipment includes   walker, elevated toilet seat, has nebulizer machine if needed. Do you have a living will or durable power of  for healthcare? denies               If yes do we have a copy on file? n/a    Do you or your family have any questions or concerns we haven't already discussed? Denies    Phone conversation done with daughter [address] with Dalia Bob. Pt lives with daughter [address] and her family. Requesting to take pt home \" unless she can't walk\". Writer explains pt has been sleeping most of the morning and therapy has been unable to complete their eval. Tea states she will be in \" and talk with her because she does this\". Will continue to monitor pt for discharge needs.

## 2021-05-10 NOTE — PROGRESS NOTES
Patient's brief wet at this time, changed and repositioned. Pt repeatedly calls out that she is in pain with her back. PRN Toradol administered at this time with additional pillow support. Will continue to monitor.

## 2021-05-10 NOTE — PROGRESS NOTES
Phone: Lois  Date: 5/10/2021  Fax: [phone]      Physical Therapy    Daily Note    Patient Name: Pallavi Valente      : 1955  (77 y.o.)  MRN: [id number]     Pt shows NO CHANGE toward goals and increased independence of mobility this treatment session  Discharge Recommendations: ECF with PT, ECF without PT     Assessment  Rolling: Stand by assistance(MAX cues)  Supine to Sit: Minimal assistance(+2 with MAX VCs)  Sit to Supine: Minimal assistance(for LEs)  Scooting: Stand by assistance(MAX VCs to bridge)    Comment: OOB not attempted due to pt refusal    Assessment: Patient is supine in bed sleeping upon entry. Patient wakes easily and agrees to work with PTA. Attempted to have patient transfer from supine to sit, but after encouragement and multiple attempts with assistance from therapist patient was unable to complete. Each time she would make some progress she would stop due to back pain. Patient did complete supine SLR and heelsides, but after these exercises patient requests to be finished with exercises as she wants to go back to sleep. Call light in reach and bed alarm in place. Will attempt treatment again tomorrow morning. Time In: 1445  Time Out: 1455  Timed Coded Minutes: 5  Total Treatment Time: 10    Exercises:  See Flowsheets    Plan  Cont Per Plan Of Care    Goals  Short Term Goals  Time Frame for Short term goals: 3days(POC exp 21)  Short term goal 1: Pt to complete supine to sit SBA to improve independence of transfers. Short term goal 2: Pt to complete stand pivot transfer Shae to improve toileting. Short term goal 3: Pt to amb 10ft with ww to improve access to bathing/bathroom.     Long Term Goals    Yajaira [facility] Therapy License Number: [id number]    Date: 5/10/2021

## 2021-05-10 NOTE — PROGRESS NOTES
Pt yelling out stating \"I'm wet and need to pee! \" Attends wet, veronica care done and pt changed. Again, does not tolerate turning side to side and yells out in pain and grabs nurses arm. Pt instructed to take deep breaths and pt states \"Okay Im sorry for yelling. \" Pt repositioned for comfort, bed alarm on. Continues to refuse lunch. Call light in reach.

## 2021-05-10 NOTE — PROGRESS NOTES
[facility]  [facility]    Date: 5/10/2021  Patient Name: Mary Johns        : 1955       [x] Pt Refusal           Pt awakens from napping stating please let me sleep. Advised pt we were from therapy and she states she does not wish to attempt sitting on edge of bed. Pt was asked when would be a better time to come back and she states she does not know. She states she is sorry.        Daya Villanueva, PTA Date: 5/10/2021

## 2021-05-10 NOTE — ACP (ADVANCE CARE PLANNING)
Advance Care Planning     Advance Care Planning Activator (Inpatient)  Conversation Note      Date of ACP Conversation: 5/10/2021   Conversation Conducted with: Patient with Decision Making Capacity   Healthcare Decision Maker: Next of Kin by law (only applies in absence of above) (name) sena Metcalf    ACP Activator: [address] [place] [address] [address] Decision Maker:     Current Designated Health Care Decision Maker:     Primary Decision Maker: Chanda Rodrigues - Child - [phone]    Secondary Decision Maker: Kathy Simon - Child - [phone]  Click here to complete Healthcare Decision Makers including section of the Healthcare Decision Maker Relationship (ie \"Primary\")  Today we documented Decision Maker(s) consistent with Legal Next of Kin hierarchy. Pt lives with [employer] but her sister Mackenzie Wellington is older sibling. Son that is older is in [employer]. Care Preferences    Ventilation: \"If you were in your present state of health and suddenly became very ill and were unable to breathe on your own, what would your preference be about the use of a ventilator (breathing machine) if it were available to you? \"      Would the patient desire the use of ventilator (breathing machine)?: Tea uncertain of pt's wishes. \"If your health worsens and it becomes clear that your chance of recovery is unlikely, what would your preference be about the use of a ventilator (breathing machine) if it were available to you? \"     Would the patient desire the use of ventilator (breathing machine)?: Tea uncertain. Resuscitation  \"CPR works best to restart the heart when there is a sudden event, like a heart attack, in someone who is otherwise healthy. Unfortunately, CPR does not typically restart the heart for people who have serious health conditions or who are very sick. \"    \"In the event your heart stopped as a result of an underlying serious health condition, would you want attempts to be made to restart your heart (answer \"yes\" for attempt to resuscitate) or would you prefer a natural death (answer \"no\" for do not attempt to resuscitate)? \" yes       [] Yes   [x] No   Educated Patient / Melaniflorentin Alpers regarding differences between Advance Directives and portable DNR orders.     Length of ACP Conversation in minutes:      Conversation Outcomes:  [x] ACP discussion completed  [] Existing advance directive reviewed with patient; no changes to patient's previously recorded wishes  [] New Advance Directive completed  [] Portable Do Not Rescitate prepared for Provider review and signature  [] POLST/POST/MOLST/MOST prepared for Provider review and signature      Follow-up plan:    [] Schedule follow-up conversation to continue planning  [] Referred individual to Provider for additional questions/concerns   [] Advised patient/agent/surrogate to review completed ACP document and update if needed with changes in condition, patient preferences or care setting    [x] This note routed to one or more involved healthcare providers

## 2021-05-11 PROCEDURE — 97166 OT EVAL MOD COMPLEX 45 MIN: CPT

## 2021-05-11 PROCEDURE — 6370000000 HC RX 637 (ALT 250 FOR IP): Performed by: INTERNAL MEDICINE

## 2021-05-11 PROCEDURE — 6360000002 HC RX W HCPCS: Performed by: INTERNAL MEDICINE

## 2021-05-11 PROCEDURE — 1200000000 HC SEMI PRIVATE

## 2021-05-11 PROCEDURE — 94761 N-INVAS EAR/PLS OXIMETRY MLT: CPT

## 2021-05-11 PROCEDURE — 2580000003 HC RX 258: Performed by: INTERNAL MEDICINE

## 2021-05-11 RX ADMIN — ACETAMINOPHEN 650 MG: 325 TABLET, FILM COATED ORAL at 20:35

## 2021-05-11 RX ADMIN — SODIUM CHLORIDE, PRESERVATIVE FREE 10 ML: 5 INJECTION INTRAVENOUS at 09:00

## 2021-05-11 RX ADMIN — ACETAMINOPHEN 650 MG: 325 TABLET, FILM COATED ORAL at 14:32

## 2021-05-11 RX ADMIN — TRAZODONE HYDROCHLORIDE 100 MG: 50 TABLET ORAL at 20:34

## 2021-05-11 RX ADMIN — APIXABAN 5 MG: 5 TABLET, FILM COATED ORAL at 20:33

## 2021-05-11 RX ADMIN — TRAMADOL HYDROCHLORIDE 50 MG: 50 TABLET, FILM COATED ORAL at 06:53

## 2021-05-11 RX ADMIN — SODIUM CHLORIDE, PRESERVATIVE FREE 10 ML: 5 INJECTION INTRAVENOUS at 18:52

## 2021-05-11 RX ADMIN — TRAMADOL HYDROCHLORIDE 50 MG: 50 TABLET, FILM COATED ORAL at 20:35

## 2021-05-11 RX ADMIN — ACETAMINOPHEN 650 MG: 325 TABLET, FILM COATED ORAL at 06:53

## 2021-05-11 RX ADMIN — SODIUM CHLORIDE, PRESERVATIVE FREE 10 ML: 5 INJECTION INTRAVENOUS at 20:36

## 2021-05-11 RX ADMIN — SODIUM CHLORIDE: 9 INJECTION, SOLUTION INTRAVENOUS at 03:19

## 2021-05-11 RX ADMIN — KETOROLAC TROMETHAMINE 15 MG: 30 INJECTION, SOLUTION INTRAMUSCULAR; INTRAVENOUS at 01:51

## 2021-05-11 RX ADMIN — CARVEDILOL 6.25 MG: 12.5 TABLET, FILM COATED ORAL at 20:34

## 2021-05-11 RX ADMIN — SPIRONOLACTONE 25 MG: 25 TABLET, FILM COATED ORAL at 20:34

## 2021-05-11 RX ADMIN — TRAMADOL HYDROCHLORIDE 50 MG: 50 TABLET, FILM COATED ORAL at 14:32

## 2021-05-11 RX ADMIN — LEVOFLOXACIN 250 MG: 5 INJECTION, SOLUTION INTRAVENOUS at 20:36

## 2021-05-11 RX ADMIN — KETOROLAC TROMETHAMINE 15 MG: 30 INJECTION, SOLUTION INTRAMUSCULAR; INTRAVENOUS at 18:51

## 2021-05-11 RX ADMIN — ONDANSETRON 4 MG: 2 INJECTION INTRAMUSCULAR; INTRAVENOUS at 00:10

## 2021-05-11 ASSESSMENT — PAIN SCALES - GENERAL
PAINLEVEL_OUTOF10: 5
PAINLEVEL_OUTOF10: 8
PAINLEVEL_OUTOF10: 6
PAINLEVEL_OUTOF10: 4
PAINLEVEL_OUTOF10: 5
PAINLEVEL_OUTOF10: 5
PAINLEVEL_OUTOF10: 0

## 2021-05-11 ASSESSMENT — PAIN DESCRIPTION - ONSET
ONSET: ON-GOING

## 2021-05-11 ASSESSMENT — PAIN DESCRIPTION - ORIENTATION
ORIENTATION: MID;LOWER
ORIENTATION: MID
ORIENTATION: MID

## 2021-05-11 ASSESSMENT — PAIN DESCRIPTION - LOCATION
LOCATION: BACK
LOCATION: BACK

## 2021-05-11 ASSESSMENT — PAIN DESCRIPTION - FREQUENCY: FREQUENCY: CONTINUOUS

## 2021-05-11 ASSESSMENT — PAIN DESCRIPTION - DESCRIPTORS: DESCRIPTORS: CONSTANT

## 2021-05-11 NOTE — PROGRESS NOTES
Pt refuses to work with PT and OT. Explained to patient that she needs to get up to the chair and try to do some exercises so she can get back home. Pt states \"I can't, just let me go. \" Will allow patient to rest a few more minutes and attempt to get patient to chair. Pt agreeable to this \"in a few minutes. \" Bed alarm on and call light in reach.

## 2021-05-11 NOTE — PROGRESS NOTES
Pt has been yelling out occassionally. This nurse in to check attends- changed at this time and veronica care provided. Pt tolerates movement poorly with change. Pt repositioned. Vitals and assessment completed. Call light in reach.

## 2021-05-11 NOTE — PROGRESS NOTES
ALMAS and RN case manager called to pt's dtr Tea, whom pt lives with, to discuss discharge planning further. ALMAS notified that Dr feels that pt may be ready for discharge tomorrow. ALMAS again offered options of home with or without home health or facility placement. Dtr is still leaning towards bringing her home. ALMAS and RN case manager notified dtr of pt's lack of response with therapy and not having been out of bed due to refusal. Spring Hamilton wants to talk with her sister to be sure that she is going to be able to come over and help to care for her. ALMAS phone number provided to Spring Hamilton and she will call ALMAS back with answer regarding plan.  Ronald Negro MSW LSW 5/11/2021

## 2021-05-11 NOTE — PLAN OF CARE
Problem: Falls - Risk of:  Goal: Will remain free from falls  Description: Will remain free from falls  5/11/2021 0732 by Miguel Ángel Oshea RN  Outcome: Ongoing     Problem: Skin Integrity:  Goal: Absence of new skin breakdown  Description: Absence of new skin breakdown  5/11/2021 0732 by Miguel Ángel Oshea RN  Outcome: Ongoing     Problem: Pain:  Goal: Pain level will decrease  Description: Pain level will decrease  5/11/2021 0732 by Miguel Ángel Oshea RN  Outcome: Ongoing     Problem: Pain:  Goal: Control of acute pain  Description: Control of acute pain  5/11/2021 0732 by Miguel Ángel Oshea RN  Outcome: Ongoing     Problem: Pain:  Goal: Control of chronic pain  Description: Control of chronic pain  5/11/2021 0732 by Miguel Ángel Oshea RN  Outcome: Ongoing     Problem: Pain:  Goal: Patient's pain/discomfort is manageable  Description: Patient's pain/discomfort is manageable  5/11/2021 0732 by Miguel Ángel Oshea RN  Outcome: Ongoing     Problem: Sensory:  Goal: General experience of comfort will improve  Description: General experience of comfort will improve  5/11/2021 0732 by Miguel Ángel Oshea RN  Outcome: Ongoing     Problem: Urinary Elimination:  Goal: Signs and symptoms of infection will decrease  Description: Signs and symptoms of infection will decrease  5/11/2021 0732 by Miguel Ángel Oshea RN  Outcome: Ongoing

## 2021-05-11 NOTE — PROGRESS NOTES
Pt asleep in bed, wakens easily to voice. Refuses to eat lunch at this time. States \"I just want to rest.\" Bed alarm on and call light in reach.

## 2021-05-11 NOTE — PROGRESS NOTES
[facility]  Physical Therapy    Date: 2021  Patient Name: Mary Johns        : 1955       [x] Pt Refusal Patient in bed upon. She agrees to try to work with PTA. However, she begins to move even the slightest bit and she cries out in pain. She then states \"I can't. I'm trying but I can't. \"  This goes on for a few minutes and she then decides she is done trying. Will attempt to progress with patient in the morning as able. [] Pt Unavailable due to:           Jacque Jo, PTA Date: 2021

## 2021-05-11 NOTE — PROGRESS NOTES
[facility]  Occupational Therapy    Date: 2021  Patient Name: Claudia Fountain        : 1955       [] Pt Refusal           [x] Pt Unavailable due to: Attempted OT eval. RN wishes to hold eval for later this am as pt finally sleeping. Will attempt later this date.           Madelaine Stein, MOT, OTR/L Date: 2021

## 2021-05-11 NOTE — PROGRESS NOTES
ALMAS received return call from pt's daughter Kourtney Hernandez. Kourtney Hernandez states that she has spoke with her sister and  and they feel that they can manage her at home. Tea reports that they have tried [facility] services in the past and pt refuses to work with them and so she doesn't feel that they will need these either. ALMAS and Tea discussed that these services can be ordered after she gets home if she feels she needs them. ALMAS also notified of medicare window to allow placement for SNF as well. Tea aware of how to contact ALMAS if needed in the future. ALMAS will plan for pt to return home with family potentially tomorrow if medically stable.  Israel Ranks MSW LSW 5/11/2021

## 2021-05-11 NOTE — PROGRESS NOTES
Pt laying in bed, continues to call out constantly saying \"I can't! My back hurts! I need help! I'm going to puke! \" RN at bedside, reassurance given. Pt was medicated by night shift RN for pain, not time for IV Toradol at this time. Back rub given, pt denies wanting to be repositioned. Bed alarm on and call light in reach.

## 2021-05-11 NOTE — PROGRESS NOTES
[facility]  Occupational Therapy  Evaluation  Date: 2021  Patient Name: Marcella Lewis        MRN: [id number]    : 1955  (77 y.o.)  Gender: female   Referring Practitioner: Dr. Ashia Tracy  Diagnosis: UTI; back pain  Additional Pertinent Hx: Pt s/p Fall on 5/3/21 with low back pain. Pt brought by family to ER date of fall then d/c home,  then d/c home and again on 21. Pt dx with UTI and Afib. Referred to OT to assess ability to care for self. Past Medical History:   Diagnosis Date    Atrial fibrillation ([address]     Cerebral artery occlusion with cerebral infarction ([address] LAST ONE 1990    X 2 NO DEFECITS    Difficult intravenous access     Hydrocephalus ([address] 2016    FORGETFUL AND SLEEPING ALOT    Hyperlipidemia 1996    ON RX    Hypertension 1996    ON RX    MRSA (methicillin resistant staph aureus) culture positive 2018    neck    Smoker     TIA (transient ischemic attack)     Wears glasses      Past Surgical History:   Procedure Laterality Date    KNEE SURGERY Right 2008    KNEE SURGERY Right     OVER 20 YRS AGO    OTHER SURGICAL HISTORY Right 2016     shunt     TUBAL LIGATION  1984    VENTRICULOPERITONEAL SHUNT Right 2016       Restrictions/Precautions: Fall Risk, Isolation(MRSA)        Subjective  Subjective: Pt in bed sleeping upon arrival.  Pain Level: 4  Pain Location: Back  Pain Orientation: Mid, Lower     Vision  Vision: Within Functional Limits  Hearing  Hearing: Within functional limits  Social/Functional History  Lives With: Family(lives with her dtr [address] and her son in law and grandchildren in their home in [facility])  IADL Comments: Pt uses a walker to get around at home. Pt also has a BSC and nebulizer available at home. Pt was not receiving any community services prior to admission. Pt's family transports her to appointments when they can get her to go. Additional Comments: Home set up and PLOF unknown.   Pt unable to provide information  Prior Function  Additional Comments: Home set up and PLOF unknown. Pt unable to provide information    Objective  ADL  Feeding: Setup  Grooming: Setup  UE Bathing: Maximum assistance  LE Bathing: Maximum assistance  UE Dressing: Maximum assistance  LE Dressing: Maximum assistance  Toileting: Maximum assistance  Additional Comments: Currently pt is requiring Max A/Total A from nursing for bathing as pt is uncooperative when asked to assist & often refuses to move. Supine to Sit: Moderate assistance, 2 Person assistance, Maximum assistance  Sit to Supine: Moderate assistance, 2 Person assistance, Maximum assistance       Balance  Sitting Balance: Minimal assistance(Min A/CGA- pt initially Min A as pt would push against therapist w/ posterior lean in order to refuse sitting but eventually was able to sit EOB w/ CGA only.)  Standing Balance: Unable to assess(comment)                 Assessment: OT evaluation completed. Pt awaken upon arrival & states \"that's a pretty balloon\" in reference to her Mother's day balloon next to her bed. Upon therapist introducing self, pt begins calling out stating \"Oh help me! My back, my legs! \", when pt wasn't complaining of pain prior to therapist introduction. Pt initially refuses to work w/ therapist stating \"Just let me rest a little longer\". Pt has been \"resting\" all morning per nursing report. RN into room and with 2-3 assist (for safety as pt has been combative in the past), pt transfers from supine to EOB w/ Mod/Max A +2. Throughout entire transfer pt yells out \"oh I can't do it! I can't! I cant'! \". Pt tolerates sitting EOB x ~5 min w/ Min A/CGA but pt yells the entire 5 min and consistently requests to return to bed so she can sleep. Pt eventually requires Mod/Max +2-3 for return to supine. Pt yells out when therapist & RN attempt to position pt for comfort. Recommend SNF at d/c as pt currently unable to care for self.  Pt's progress regarding regaining independence w/ self care is likely limited secondary to pt's lack of engagement & willingness to participate in therapy. Recommend OT 1x per day x 3 days.      Goals  Short term goals  Time Frame for Short term goals: 3 days (5/13/2021)  Short term goal 1: Pt to complete supine to sit w/ Min A +1-2  Short term goal 2: Pt to tolerate sitting EOB x 5-8 min in order to complete grooming tasks while EOB  Short term goal 3: Pt to engage in JAMES UB exercises/activity x 5 min w/ 2 or fewer rest breaks  Short term goal 4: Pt to complete SPT to bedside chair/BSC w/ Mod A +2    Plan  REQUIRES OT FOLLOW UP: Yes    Times per week: 3x  Times per day: Daily(1x per day as pt tolerates)         Time In: 1414  Time Out: 1428  Timed Coded Minutes: 0  Total Treatment Time: 14    MIRELLA James, OTR/L  5/11/2021

## 2021-05-11 NOTE — PROGRESS NOTES
Hospitalist Progress Note  5/11/2021 6:29 AM  Subjective:   Admit Date: 5/8/2021  PCP: JAYLAN Jiménez    Interval History: Bianca Rawls has no complaints this am.  She denies having pain. No chest pain or SOB. She knows where she is but unable to answer any other questions. She feels her appetite is okay but nursing reports that she only ate supper yesterday. Diet: DIET GENERAL;  Medications:   Scheduled Meds:   traZODone  50 mg Oral Nightly    levofloxacin  250 mg Intravenous Q24H    lidocaine  1 patch Transdermal Daily    nitrofurantoin (macrocrystal-monohydrate)  100 mg Oral Once    apixaban  5 mg Oral BID    carvedilol  6.25 mg Oral BID    furosemide  40 mg Oral BID    lisinopril  40 mg Oral Daily    atorvastatin  10 mg Oral Daily    spironolactone  25 mg Oral BID    sodium chloride flush  5-40 mL Intravenous 2 times per day     Continuous Infusions:   sodium chloride      sodium chloride 75 mL/hr at 05/11/21 0319       Patient's current medications documented, reviewed, and updated. CBC:   Recent Labs     05/08/21  1730 05/09/21  0516 05/10/21  0508   WBC 16.6* 11.4* 12.0*   HGB 15.1 12.9 13.2    191 206     BMP:    Recent Labs     05/08/21  1730 05/09/21  0516 05/10/21  0508    139 140   K 4.8 3.9 3.8    106 107   CO2 24 26 22   BUN 28* 27* 25*   CREATININE 0.92* 0.96* 1.05*   GLUCOSE 211* 120* 137*     Hepatic:   Recent Labs     05/08/21  1730   AST 16   ALT 6   BILITOT 1.35*   ALKPHOS 106*     Troponin: No results for input(s): TROPONINI in the last 72 hours. BNP: No results for input(s): BNP in the last 72 hours. Lipids: No results for input(s): CHOL, HDL in the last 72 hours.     Invalid input(s): LDLCALCU  INR:   Recent Labs     05/08/21  1730   INR 1.2         Objective:   Vitals: BP (!) 151/84 Comment: just repositioned  Pulse 90   Temp 98 °F (36.7 °C) (Oral)   Resp 16   Ht 5' 2\" (1.575 m)   Wt 143 lb 3.2 oz (65 kg)   LMP 12/13/1990 (Approximate)   SpO2 97%   BMI 26.19 kg/m²   General appearance: alert and cooperative with exam  HEENT: Head: Normocephalic, no lesions, without obvious abnormality. Eye: Normal external eye, conjunctiva, lids cornea, MELYSSA. Nose: Normal external nose, mucus membranes and septum. Neck: no adenopathy, no carotid bruit and supple, symmetrical, trachea midline  Lungs: clear to auscultation bilaterally  Heart: irregularly irregular rhythm, S1, S2 normal and II/VI systolic murmur. Abdomen: soft, non-tender; bowel sounds normal; no masses,  no organomegaly  Extremities: extremities normal, atraumatic, no cyanosis or edema  Neurologic: Mental status: Alert and oriented to place. Assessment and Plan:   1.  UTI - on IV Levaquin. Culture growing out non fermenting gm - robs. 2.  Mental status change - patient oriented to place only. Not sure what patient's baseline mental status is like but she likely has some underlying dementia. 3.  Atrial fib with RVR - On Coreg for rate control and anticoagulated with Eliquis. 4.  Acute low back pain from fall on 5/3/21- CT of the thoracic and lumbar spine failed to show a fracture but showed ankylosing spondylitis of the thoracic spine. On Toradol, Tramadol, and Lidoderm patch. PT / OT ordered daily. 5.  HTN - BP stable on Coreg, Lisinopril, and diuretic. 6.  H/O NPH - S/P right shunt 2016.    7.  Generalized weakness - on PT / OT daily. Plan:  1. Continue the current treatment. 2.  Follow up on sensitivity when available. 3.  Discharge planning (should be medically stable for discharge tomorrow). Patient continues to require in patient admission secondary to the need for IV antibiotics and therapy.       DVT prophylaxis:   [] Lovenox   [] SCDs   [] SQ Heparin   [] Encourage ambulation, low risk for DVT, no chemical or mechanical    prophylaxis necessary      [x] Already on Anticoagulation    Patient Active Problem List:     Acute cystitis without hematuria     Encephalopathy acute     Simple chronic bronchitis (HCC)     Essential hypertension     Normal pressure hydrocephalus (HCC)     Altered mental status     Abnormal head CT     Hypertensive emergency     Urinary tract infection without hematuria     Abnormal MRI     Cerebrovascular disease     Closed fracture of single pubic ramus of pelvis, right, initial encounter (HCC)     UTI (urinary tract infection)      Documentation of the Current Medications in the Medical Record    (x)  I have utilized all available immediate resources to obtain, update, or review the patient's current medications. (Satisfies MIPS Performance)  If Yes, Stop Here  ( )  The patient is not eligible for medication reconciliation; the patient is in an emergent medical situation where delaying treatment would jeopardize the patient's health. (MIPS Performance exception / exclusion)  ( )  I did not confirm, update or review the patient's current list of medications today. (Does not satisfy MIPS Performance)        Advanced Care Plan    (x)  I confirmed that the patient's Advanced Care Plan is present, code status documented, or surrogate decision maker is listed in the patient's medical record. ( )  The patient's advanced care plan is not present because:  (select)   ( ) I confirmed today that the patient does not wish or was not able to name a surrogate decision maker or provide an [address] ( ) Hospice care is currently being provided or has been provided this calender year. ( )  I did not confirm today the presence of an [address] or surrogate decision maker documented within the patient's medical record. (Does not satisfy MIPS performance).             Josh Burroughs MD  [facility]

## 2021-05-11 NOTE — PROGRESS NOTES
[facility]  Physical Therapy    Date: 2021  Patient Name: Evelyn Jo        : 1955       [] Pt Refusal           [x] Pt Unavailable due to:  Nursing requests to hold off on seeing patient at this present time as she is finally settled down and is sleeping. Will check back as able.         Romy Gongora, PTA Date: 2021

## 2021-05-11 NOTE — PLAN OF CARE
Family/Caregiver Education    [x] Improve UE strength  [x] Patient Education: [x]Adaptive Equipment   [x]Home Exercise Program and Progression    Treatment Plan  Plan  Times per week: 3x  Times per day: Daily(1x per day as pt tolerates)  Plan weeks: 3 days  [] Modalities:  [x] Therapeutic Exercise   [x] Therapeutic Activity  [] Splinting:     [] Home Safety Evaluation         [x] ADL Retraining                       [] Muscle Re-education [] Cognitive Retraining            [] Sensory Integration  [x] Patient Education [x] Home Exercise Program [] Fine Motor Coordination  Discharge Recommendations: Subacute/Skilled Nursing Facility    GOALS  Short term goals  Time Frame for Short term goals: 3 days (5/13/2021)  Short term goal 1: Pt to complete supine to sit w/ Min A +1-2  Short term goal 2: Pt to tolerate sitting EOB x 5-8 min in order to complete grooming tasks while EOB  Short term goal 3: Pt to engage in JAMES UB exercises/activity x 5 min w/ 2 or fewer rest breaks  Short term goal 4: Pt to complete SPT to bedside chair/BSC w/ Mod A +2    Upon Swingbed Transfer this Plan of Care will be followed until revision is completed.     Genoveva Matute, MIRELLA,OTR/L                    Date: 5/11/2021

## 2021-05-11 NOTE — PLAN OF CARE
Problem: Falls - Risk of:  Goal: Will remain free from falls  Description: Will remain free from falls  Outcome: Ongoing  Goal: Absence of physical injury  Description: Absence of physical injury  Outcome: Ongoing     Problem: Skin Integrity:  Goal: Will show no infection signs and symptoms  Description: Will show no infection signs and symptoms  Outcome: Ongoing  Goal: Absence of new skin breakdown  Description: Absence of new skin breakdown  Outcome: Ongoing     Problem: Pain:  Goal: Pain level will decrease  Description: Pain level will decrease  Outcome: Ongoing  Goal: Control of acute pain  Description: Control of acute pain  Outcome: Ongoing  Goal: Control of chronic pain  Description: Control of chronic pain  Outcome: Ongoing  Goal: Patient's pain/discomfort is manageable  Description: Patient's pain/discomfort is manageable  Outcome: Ongoing     Problem: Sensory:  Goal: General experience of comfort will improve  Description: General experience of comfort will improve  Outcome: Ongoing     Problem: Urinary Elimination:  Goal: Signs and symptoms of infection will decrease  Description: Signs and symptoms of infection will decrease  Outcome: Ongoing     Problem: Infection:  Goal: Will remain free from infection  Description: Will remain free from infection  Outcome: Ongoing     Problem: Safety:  Goal: Free from accidental physical injury  Description: Free from accidental physical injury  Outcome: Ongoing     Problem: Daily Care:  Goal: Daily care needs are met  Description: Daily care needs are met  Outcome: Ongoing     Problem: Skin Integrity:  Goal: Skin integrity will stabilize  Description: Skin integrity will stabilize  Outcome: Ongoing     Problem: Mental Status - Impaired:  Goal: Mental status will improve  Description: Mental status will improve  Outcome: Ongoing     Problem: Nutrition  Goal: Optimal nutrition therapy  Outcome: Ongoing

## 2021-05-12 VITALS
HEART RATE: 94 BPM | OXYGEN SATURATION: 98 % | DIASTOLIC BLOOD PRESSURE: 84 MMHG | RESPIRATION RATE: 18 BRPM | TEMPERATURE: 97.9 F | WEIGHT: 143.2 LBS | SYSTOLIC BLOOD PRESSURE: 165 MMHG | BODY MASS INDEX: 26.35 KG/M2 | HEIGHT: 62 IN

## 2021-05-12 LAB
CULTURE: ABNORMAL
CULTURE: ABNORMAL
Lab: ABNORMAL
SPECIMEN DESCRIPTION: ABNORMAL

## 2021-05-12 PROCEDURE — 2580000003 HC RX 258: Performed by: INTERNAL MEDICINE

## 2021-05-12 PROCEDURE — 6370000000 HC RX 637 (ALT 250 FOR IP): Performed by: INTERNAL MEDICINE

## 2021-05-12 PROCEDURE — 6360000002 HC RX W HCPCS: Performed by: INTERNAL MEDICINE

## 2021-05-12 PROCEDURE — 94761 N-INVAS EAR/PLS OXIMETRY MLT: CPT

## 2021-05-12 RX ORDER — LEVOFLOXACIN 250 MG/1
250 TABLET ORAL DAILY
Qty: 5 TABLET | Refills: 0 | Status: SHIPPED | OUTPATIENT
Start: 2021-05-12 | End: 2021-05-17

## 2021-05-12 RX ADMIN — ATORVASTATIN CALCIUM 10 MG: 20 TABLET, FILM COATED ORAL at 08:17

## 2021-05-12 RX ADMIN — KETOROLAC TROMETHAMINE 15 MG: 30 INJECTION, SOLUTION INTRAMUSCULAR; INTRAVENOUS at 00:43

## 2021-05-12 RX ADMIN — FUROSEMIDE 40 MG: 20 TABLET ORAL at 08:17

## 2021-05-12 RX ADMIN — TRAMADOL HYDROCHLORIDE 50 MG: 50 TABLET, FILM COATED ORAL at 16:40

## 2021-05-12 RX ADMIN — ACETAMINOPHEN 650 MG: 325 TABLET, FILM COATED ORAL at 08:17

## 2021-05-12 RX ADMIN — SODIUM CHLORIDE, PRESERVATIVE FREE 10 ML: 5 INJECTION INTRAVENOUS at 08:18

## 2021-05-12 RX ADMIN — SPIRONOLACTONE 25 MG: 25 TABLET, FILM COATED ORAL at 08:17

## 2021-05-12 RX ADMIN — SODIUM CHLORIDE, PRESERVATIVE FREE 10 ML: 5 INJECTION INTRAVENOUS at 00:43

## 2021-05-12 RX ADMIN — KETOROLAC TROMETHAMINE 15 MG: 30 INJECTION, SOLUTION INTRAMUSCULAR; INTRAVENOUS at 16:41

## 2021-05-12 RX ADMIN — APIXABAN 5 MG: 5 TABLET, FILM COATED ORAL at 08:17

## 2021-05-12 RX ADMIN — LISINOPRIL 40 MG: 10 TABLET ORAL at 08:17

## 2021-05-12 RX ADMIN — CARVEDILOL 6.25 MG: 12.5 TABLET, FILM COATED ORAL at 08:17

## 2021-05-12 RX ADMIN — FUROSEMIDE 40 MG: 20 TABLET ORAL at 16:40

## 2021-05-12 RX ADMIN — TRAMADOL HYDROCHLORIDE 50 MG: 50 TABLET, FILM COATED ORAL at 08:17

## 2021-05-12 RX ADMIN — KETOROLAC TROMETHAMINE 15 MG: 30 INJECTION, SOLUTION INTRAMUSCULAR; INTRAVENOUS at 08:16

## 2021-05-12 RX ADMIN — ACETAMINOPHEN 650 MG: 325 TABLET, FILM COATED ORAL at 16:40

## 2021-05-12 ASSESSMENT — PAIN SCALES - GENERAL
PAINLEVEL_OUTOF10: 5
PAINLEVEL_OUTOF10: 5
PAINLEVEL_OUTOF10: 3
PAINLEVEL_OUTOF10: 5
PAINLEVEL_OUTOF10: 5

## 2021-05-12 ASSESSMENT — PAIN DESCRIPTION - ONSET: ONSET: ON-GOING

## 2021-05-12 ASSESSMENT — PAIN DESCRIPTION - ORIENTATION: ORIENTATION: MID

## 2021-05-12 ASSESSMENT — PAIN DESCRIPTION - LOCATION: LOCATION: BACK

## 2021-05-12 ASSESSMENT — PAIN DESCRIPTION - FREQUENCY: FREQUENCY: CONTINUOUS

## 2021-05-12 ASSESSMENT — PAIN DESCRIPTION - PAIN TYPE: TYPE: ACUTE PAIN

## 2021-05-12 ASSESSMENT — PAIN DESCRIPTION - DESCRIPTORS: DESCRIPTORS: CONSTANT

## 2021-05-12 NOTE — PROGRESS NOTES
[facility] called and spoke with pt's dtr Tea and informed that pt is discharged to return home today. Tea agreeable to come and get her this afternoon between 1230 and 130 pm. SW offered different avenues of transportation if they did not feel that they could get pt in the car due to her lack of movement and pain level here. Dtr reports that she feels that they will be fine to get her with their car and her  can assist with transferring her and getting her in the house when they arrive home. Nursing notified of mode of transportation and time dtr is planning. No further needs expressed by dtr at this time and she is aware of how to contact SW if needed in the future.   Daja Blakelys MSW DARIANW 5/12/2021

## 2021-05-12 NOTE — PROGRESS NOTES
Pt becomes verbally and physically combative when attempting to reposition patient to safely administer medications. Pt agrees to receive PRN Toradol and then have HOB increased to take oral medications in pudding. HOB increased to semi-fowlers pt yelling out in pain and begging to have HOB lowered. Pt is able to take two bites of pudding with pillows and sip water; pills safely swallowed and HOB slowly decreased by 10 degrees; pt fed bites of scrambled eggs without difficulty and takes sips of pepsi. Pt refusing to be repositioned on side to check brief at this time.

## 2021-05-12 NOTE — ADT AUTH CERT
Utilization Reviews       Urinary Tract Infection (UTI) - Care Day 3 (5/10/2021) by Sahra Benjamin RN       Review Status Review Entered   Completed 5/12/2021 07:15      Criteria Review      Care Day: 3 Care Date: 5/10/2021 Level of Care: Inpatient Floor    Guideline Day 3    Level Of Care    ( ) Complete discharge planning    5/12/2021 7:15 AM EDT by Anisa love with daughter    Clinical Status    ( ) * Hemodynamic stability    5/12/2021 7:15 AM EDT by Mary Morales      wbc 12.0  contact isol, mrsa    98.0  16  89  132/56  96%ra    ( ) * Mental status at baseline    5/12/2021 7:15 AM EDT by Mary Morales      confusion, alert and moaning    ( ) * Antibiotic regimen for next level of care established    (X) * Urine output adequate    (X) * Renal function at baseline or acceptable for next level of care    ( ) * Pain absent or managed    5/12/2021 7:15 AM EDT by Mary Morales      rates pain 5/10    (X) * Oral intake adequate    (X) * Afebrile or temperature acceptable for next level of care    ( ) * Vomiting absent    5/12/2021 7:15 AM EDT by Mary Morales      nausea    ( ) * Discharge plans and education understood    Activity    ( ) * Ambulatory or acceptable for next level of care    5/12/2021 7:15 AM EDT by Mary Morales      up to chair    Routes    (X) * Oral hydration, medications, [J] and diet    5/12/2021 7:15 AM EDT by Mary Morales      eliquis 5mg bid, coreg 6.25mg bid, lasix 40mg bid, aldactone 25m gqd, lisinopril 20 mg qd    ivf @ 75 cc/hr  zofran 4mg iv given    Interventions    ( ) Renal function tests, urinalysis    5/12/2021 7:15 AM EDT by Mary Morales      bun 25  creat 1.05    Medications    (X) Antibiotics [K]    5/12/2021 7:15 AM EDT by Mary Morales      levaquin 250mg iv q24hrs    (X) Possible analgesics    5/12/2021 7:15 AM EDT by Mary Morales      toradol 15mg iv x2, tylenol 650mg x3, ultram 50mg x3    * Milestone      Urinary Tract Infection (UTI) - Care Day 2 (5/9/2021) by Anne Link Avis Vaughn RN       Review Status Review Entered   Completed 5/11/2021 08:37      Criteria Review      Care Day: 2 Care Date: 5/9/2021 Level of Care: Inpatient Floor    Guideline Day 2    Clinical Status    (X) * Hypotension absent    5/11/2021 8:37 AM EDT by Lucy Echevarria      124/66 88 16 97.7 94% room air    ( ) * Mental status at baseline    5/11/2021 8:37 AM EDT by Lucy Echevarria      Awake, confused, repeatedly shouts \"I can'tt do it, please help me\".     (X) * Afebrile or fever improved    5/11/2021 8:37 AM EDT by [facility]      Temp 97.5-98.1    (X) * Vomiting absent or improved    Activity    ( ) * Ambulatory    5/11/2021 8:37 AM EDT by [facility]      Activity Tolerance: Patient limited by cognitive status    Routes    (X) Advance diet as tolerated    5/11/2021 8:37 AM EDT by [facility]      General diet    Interventions    (X) * Reversible urinary system abnormality (eg, obstruction, abscess) absent, addressed, or to be treated at next level of care    (X) WBC    5/11/2021 8:37 AM EDT by Erle Kocher Results for Tyson Steel (MRN [id number]) as of 5/11/2021 08:30    5/9/2021 05:16  WBC: 11.4 (H)    (X) Renal function tests    5/11/2021 8:37 AM EDT by [facility]      Results for Tyson Tripletts (MRN [id number]) as of 5/11/2021 08:30    5/9/2021 05:16  Sodium: 139  Potassium: 3.9  Chloride: 106  CO2: 26  BUN: 27 (H)  Creatinine: 0.96 (H)  Bun/Cre Ratio: 28 (H)  Anion Gap: 7 (L)  GFR Non-: 58 (L)  GFR  Amer    Medications    (X) Antibiotics    5/11/2021 8:37 AM EDT by [facility]      levoFLOXacin (LEVAQUIN) 500 MG/100ML infusion 500 mg   Dose: 500 mg  Freq: EVERY 24 HOURS Route: IV    (X) Possible analgesics    5/11/2021 8:37 AM EDT by Lucy Echevarria      ketorolac (TORADOL) injection 15 mg   Dose: 15 mg  Freq: EVERY 6 HOURS PRN Route: IV x 4    traMADol (ULTRAM) tablet 50 mg   Dose: 50 mg  Freq: EVERY 6 HOURS PRN Route: PO x 2    * Milestone   Additional Notes 5/9/21       Note:      History of Present Illness:            The patient is a 77 y.o. female presented to the emergency room for evaluation of lower back pain.  Apparently she fell last Monday on 5/3  and since then has been having significant pain in her lower back. The pain is constant, worse with movements, nonradiating.  Apparently the patient was walking with her scooter and the scooter got caught on the furniture and she fell backwards landing on her back. Forrest Cummings was evaluated in the emergency room on 5/3/2021 for acute back pain and fall.  Imaging studies did not reveal acute abnormalities.  She was treated with IV morphine and discharged home, recommended OTC pain medications and follow-up with PCP. Forrest Cummings did not do well and her family brought her back to ER on 5/4 for evaluation.  This time she was treated with IV Solu-Medrol, fentanyl and discharged home with [place].   I spoke with patient's daughter Primitivo Abel  this morning regarding patient's low back pain.  She said the patient does not have a history of chronic low back pain and the problem is new.  She states that they tried Tylenol and Norco at home for pain control and unfortunately did not help.  The patient was not ambulating as before and her oral intake was also worsening.  In addition, she became more confused and they had to bring her back to the emergency room yesterday for evaluation.  Patient had no fevers, no chills, no chest pain, no shortness of breath, no nausea/vomiting, no diarrhea.    Work-up in ER yesterday revealed no fever, blood pressure was 158/89, she was noted to be tachycardic and EKG revealed A. fib with RVR with heart rate of 128.  Her troponin was 18.  She was treated with IV Cardizem and also oral Cardizem and her heart rate stabilized.  Her BMP was unremarkable except elevated BUN of 28 and creatinine 0.92.  Glucose was 311.  CBC revealed elevated WBC count 16.6, hemoglobin 15.1, platelets 381.  Urinalysis revealed 3+ leukocyte esterase, 20-50 WBCs, no bacteria.  Due to persisting back pain and UTI with altered mental status the patient was deemed appropriate for admission   This morning she continues to complain of back pain, somewhat confused         Physical Exam:       General Appearance: Awake, confused, repeatedly shouts \"I can'tt do it, please help me\".  In no acute distress   Cardiovascular: normal rate, regular rhythm, normal S1 and S2, no murmurs, rubs, clicks, or gallops, distal pulses intact   Pulmonary/Chest: clear to auscultation bilaterally-.no wheezes, rales or rhonchi, normal air movement, no respiratory distress   Abdomen: soft, non-tender, non-distended, normal bowel sounds   Extremities: no cyanosis, clubbing or edema   Skin: warm and dry, no rash or erythema   Head: normocephalic and atraumatic, oral mucosa moist   Eyes: pupils equal, round, and reactive to light   Neck: supple and non-tender without mass, no thyromegaly    Musculoskeletal: Tenderness in the thoracolumbar area bilaterally, back without deformities   Neurological:  normal speech, no focal findings or movement disorder noted      Physical Exam:       General Appearance: Awake, confused, repeatedly shouts \"I can'tt do it, please help me\".  In no acute distress   Cardiovascular: normal rate, regular rhythm, normal S1 and S2, no murmurs, rubs, clicks, or gallops, distal pulses intact   Pulmonary/Chest: clear to auscultation bilaterally-.no wheezes, rales or rhonchi, normal air movement, no respiratory distress   Abdomen: soft, non-tender, non-distended, normal bowel sounds   Extremities: no cyanosis, clubbing or edema   Skin: warm and dry, no rash or erythema   Head: normocephalic and atraumatic, oral mucosa moist   Eyes: pupils equal, round, and reactive to light   Neck: supple and non-tender without mass, no thyromegaly    Musculoskeletal: Tenderness in the thoracolumbar area bilaterally, back without deformities   Neurological:  normal speech, no focal findings or movement disorder noted         PT:          Assessment   Activity Tolerance: Patient limited by cognitive status    Body structures, Functions, Activity limitations: Decreased functional mobility , Decreased ADL status, Decreased balance, Decreased high-level IADLs, Increased pain, Decreased posture, Decreased cognition, Decreased endurance, Decreased safe awareness   Chart Reviewed: Yes   Assessment: Pt Shae+1-2 for supine to sit but MAX VCs to participate from both PT and nursing.  Pt frequently argues and yells out \"I can't do it\" \"Leave me alone\"  PLOF unable to be attained by pt due to current mentation.  Per review of notes, family stated pt was transferring to scooter and that is her primary mobility, but unclear if this information is accurate.  Will attempt PT services for 3 days, if pt continues to refuse to participate will D/C.  Pt to benefit from skilled placement due to poor mobility bryan and decreased ability to care for herself.    Prognosis: Poor   Discharge Recommendations: ECF with PT, ECF without PT         Results for Arti Randall (MRN [id number]) as of 5/11/2021 08:30      5/9/2021 05:16   Sodium: 139   Potassium: 3.9   Chloride: 106   CO2: 26   BUN: 27 (H)   Creatinine: 0.96 (H)   Bun/Cre Ratio: 28 (H)   Anion Gap: 7 (L)   GFR Non-: 58 (L)   GFR : >60   Glucose: 120 (H)   Calcium: 8.2 (L)   GFR Comment: Pend   GFR Staging: NOT REPORTED   WBC: 11.4 (H)   RBC: 4.53   Hemoglobin Quant: 12.9   Hematocrit: 39.0   MCV: 86.1   MCH: 28.4   MCHC: 33.0   MPV: NOT REPORTED   RDW: 14.7   Platelet Count: 013   Platelet Estimate: NOT REPORTED   Absolute Mono #: 0.90   Eosinophils %: 1   Basophils Absolute: 0.10   Differential Type: YES   Seg Neutrophils: 72   Segs Absolute: 8.30 (H)   Lymphocytes: 18   Absolute Lymph #: 2.00   Monocytes: 8   Absolute Eos #: 0.10   Basophils: 1   Immature Granulocytes: NOT REPORTED   WBC Morphology: NOT REPORTED   RBC Morphology: NOT REPORTED   Absolute Immature Granulocyte: NOT REPORTED   NRBC Automated: NOT REPORTED      5/9/2021 11:16   Troponin T: NOT REPORTED   Troponin, High Sensitivity: 16 (H)   Troponin Interp: NOT REPORTED      apixaban (ELIQUIS) tablet 5 mg    Dose: 5 mg   Freq: 2 TIMES DAILY Route: PO      atorvastatin (LIPITOR) tablet 10 mg    Dose: 10 mg   Freq: DAILY Route: PO      carvedilol (COREG) tablet 6.25 mg    Dose: 6.25 mg   Freq: 2 TIMES DAILY Route: PO      furosemide (LASIX) tablet 40 mg    Dose: 40 mg   Freq: 2 TIMES DAILY Route: PO      levoFLOXacin (LEVAQUIN) 500 MG/100ML infusion 500 mg    Dose: 500 mg   Freq: EVERY 24 HOURS Route: IV      lidocaine 4 % external patch 1 patch    Dose: 1 patch   Freq: DAILY Route: TD      lisinopril (PRINIVIL;ZESTRIL) tablet 40 mg    Dose: 40 mg   Freq: DAILY Route: PO      spironolactone (ALDACTONE) tablet 25 mg    Dose: 25 mg   Freq: 2 TIMES DAILY Route: PO      acetaminophen (TYLENOL) tablet 650 mg    Dose: 650 mg   Freq: EVERY 6 HOURS PRN Route: PO x 2       ketorolac (TORADOL) injection 15 mg    Dose: 15 mg   Freq: EVERY 6 HOURS PRN Route: IV x 4       traMADol (ULTRAM) tablet 50 mg    Dose: 50 mg   Freq: EVERY 6 HOURS PRN Route: PO x 2       traZODone (DESYREL) tablet 100 mg    Dose: 100 mg   Freq: NIGHTLY PRN Route: PO x 1

## 2021-05-12 NOTE — PROGRESS NOTES
Normocephalic, no lesions, without obvious abnormality. Eye: Normal external eye, conjunctiva, lids cornea, MELYSSA. Nose: Normal external nose, mucus membranes and septum. Neck: no adenopathy, no carotid bruit and supple, symmetrical, trachea midline  Lungs: clear to auscultation bilaterally  Heart: irregularly irregular rhythm, S1, S2 normal and II/VI systolic murmur. Abdomen: soft, non-tender; bowel sounds normal; no masses,  no organomegaly  Extremities: extremities normal, atraumatic, no cyanosis or edema  Neurologic: Mental status: Alert, oriented to place and person only. Assessment and Plan:   1.  UTI - on IV Levaquin. Culture growing out non fermenting gm - robs but sensitivity not obtained secondary to the low counts on culture. 2.  Mental status change - patient oriented to place only.  Not sure what patient's baseline mental status is like but she likely has some underlying dementia.  Apparently, family feels she is at baseline. 3.  Atrial fib with RVR - On Coreg for rate control and anticoagulated with Eliquis. 4.  Acute low back pain from fall on 5/3/21- CT of the thoracic and lumbar spine failed to show a fracture but showed ankylosing spondylitis of the thoracic spine.  On Toradol, Tramadol, and Lidoderm patch.  PT / OT ordered daily. 5.  HTN - BP stable on Coreg, Lisinopril, and diuretic. 6.  H/O NPH - S/P right shunt 2016.    7.  Generalized weakness - on PT / OT daily. Plan:  1. Discharge home today as family does not wish ECF placement. 2.  Continue on oral antibiotic.        DVT prophylaxis:   [] Lovenox   [] SCDs   [] SQ Heparin   [] Encourage ambulation, low risk for DVT, no chemical or mechanical    prophylaxis necessary      [x] Already on Anticoagulation    Patient Active Problem List:     Acute cystitis without hematuria     Encephalopathy acute     Simple chronic bronchitis (HCC)     Essential hypertension     Normal pressure hydrocephalus (HCC)     Altered mental status     Abnormal head CT     Hypertensive emergency     Urinary tract infection without hematuria     Abnormal MRI     Cerebrovascular disease     Closed fracture of single pubic ramus of pelvis, right, initial encounter (HCC)     UTI (urinary tract infection)      Documentation of the Current Medications in the Medical Record    (x)  I have utilized all available immediate resources to obtain, update, or review the patient's current medications. (Satisfies MIPS Performance)  If Yes, Stop Here  ( )  The patient is not eligible for medication reconciliation; the patient is in an emergent medical situation where delaying treatment would jeopardize the patient's health. (MIPS Performance exception / exclusion)  ( )  I did not confirm, update or review the patient's current list of medications today. (Does not satisfy MIPS Performance)      Advanced Care Plan    (x)  I confirmed that the patient's Advanced Care Plan is present, code status documented, or surrogate decision maker is listed in the patient's medical record. ( )  The patient's advanced care plan is not present because:  (select)   ( ) I confirmed today that the patient does not wish or was not able to name a surrogate decision maker or provide an [address] ( ) Hospice care is currently being provided or has been provided this calender year. ( )  I did not confirm today the presence of an [address] or surrogate decision maker documented within the patient's medical record. (Does not satisfy MIPS performance).         Leo Juarez MD  [facility] Hospitalist

## 2021-05-12 NOTE — PLAN OF CARE
intentional harm  Outcome: Ongoing     Problem: Daily Care:  Goal: Daily care needs are met  Description: Daily care needs are met  Outcome: Ongoing     Problem: Skin Integrity:  Goal: Skin integrity will stabilize  Description: Skin integrity will stabilize  Outcome: Ongoing     Problem: Discharge Planning:  Goal: Patients continuum of care needs are met  Description: Patients continuum of care needs are met  Outcome: Ongoing     Problem: Mental Status - Impaired:  Goal: Mental status will improve  Description: Mental status will improve  Outcome: Ongoing     Problem: Nutrition  Goal: Optimal nutrition therapy  Outcome: Ongoing

## 2021-05-12 NOTE — PROGRESS NOTES
Pt up out of bed with maximum assist x3 RN's to wheelchair; pt yelling \"I can't!\" repeatedly. Pt assisted to daughter's Rhonda Webster with much effort. Daughter states she will review discharge instructions upon getting home.

## 2021-05-12 NOTE — PROGRESS NOTES
Pt turned on side to have saturated brief changed; veronica-care completed and patient repositioned. Pt tolerates turning better than having HOB increased and allows nursing to provide care. Bed alarm remains on and call light and bed side table all within reach.

## 2021-05-12 NOTE — DISCHARGE SUMMARY
Hospitalist Discharge Summary    Franklin Proctor  :  1955  MRN:  [id number]    Admit date:  2021  Discharge date:  21    Admitting Physician:  Daisy Price MD    Discharge Diagnoses:   UTI       Mental status change. Atrial fib with RVR. Admission Condition:  poor      Discharged Condition:  fair    Hospital Course: The patient is a 77 y.o. female presented to the emergency room for evaluation of lower back pain. Apparently she fell last Monday on 5/3  and since then has been having significant pain in her lower back. The pain is constant, worse with movements, nonradiating. Apparently the patient was walking with her scooter and the scooter got caught on the furniture and she fell backwards landing on her back. She was evaluated in the emergency room on 5/3/2021 for acute back pain and fall. Imaging studies did not reveal acute abnormalities. She was treated with IV morphine and discharged home, recommended OTC pain medications and follow-up with PCP. She did not do well and her family brought her back to ER on  for evaluation. This time she was treated with IV Solu-Medrol, fentanyl and discharged home with [place].  I spoke with patient's daughter Rogerio Arrant  this morning regarding patient's low back pain. She said the patient does not have a history of chronic low back pain and the problem is new. She states that they tried Tylenol and Norco at home for pain control and unfortunately did not help. The patient was not ambulating as before and her oral intake was also worsening. In addition, she became more confused and they had to bring her back to the emergency room yesterday for evaluation. Patient had no fevers, no chills, no chest pain, no shortness of breath, no nausea/vomiting, no diarrhea. Work-up in ER yesterday revealed no fever, blood pressure was 158/89, she was noted to be tachycardic and EKG revealed A. fib with RVR with heart rate of 128. Her troponin was 18. She was treated with IV Cardizem and also oral Cardizem and her heart rate stabilized. Her BMP was unremarkable except elevated BUN of 28 and creatinine 0.92. Glucose was 311. CBC revealed elevated WBC count 16.6, hemoglobin 15.1, platelets 155. Urinalysis revealed 3+ leukocyte esterase, 20-50 WBCs, no bacteria. Due to persisting back pain and UTI with altered mental status the patient was deemed appropriate for admission. Alyson Adrian was admitted on IV hydration with normal saline and IV Levaquin. PT / OT was ordered daily. Tramadol, Toradol, and Lidoderm patch was ordered for back pain. Urine culture grew out [address] [place] [address] <32010 CFU/ML,  susceptibility testing not performed on low colony count organisms. During her admission she was only oriented to place and person but family felt this was at her baseline. ECF placement to continue with rehab was discussed but family refused placement and felt they could care for her at home. Home care was discussed, and again they refused. Her heart rate was controlled the majority of the time on Coreg. She continued on Eliquis during her admission. With her improvement it was felt she could be discharged home with 24 hour care by her family. Discharge Exam:    Vitals: BP (!) 161/98   Pulse 106   Temp 98.4 °F (36.9 °C) (Oral)   Resp 20   Ht 5' 2\" (1.575 m)   Wt 143 lb 3.2 oz (65 kg)   LMP 12/13/1990 (Approximate)   SpO2 96%   BMI 26.19 kg/m²   General appearance: alert and cooperative with exam  HEENT: Head: Normocephalic, no lesions, without obvious abnormality. Eye: Normal external eye, conjunctiva, lids cornea, MELYSSA. Nose: Normal external nose, mucus membranes and septum. Neck: no adenopathy, no carotid bruit and supple, symmetrical, trachea midline  Lungs: clear to auscultation bilaterally  Heart: irregularly irregular rhythm, S1, S2 normal and II/VI systolic murmur.   Abdomen: soft, non-tender; bowel sounds normal; no masses,  no organomegaly  Extremities: extremities normal, atraumatic, no cyanosis or edema  Neurologic: Mental status: Alert, oriented to place and person only. Discharge Medications:       Mary Kay Barraza   Home Medication Instructions [id number]    Printed on:05/12/21 2368   Medication Information                      apixaban (ELIQUIS) 5 MG TABS tablet  Take 5 mg by mouth 2 times daily             carvedilol (COREG) 6.25 MG tablet  Take 6.25 mg by mouth 2 times daily             dilTIAZem (CARDIZEM CD) 120 MG extended release capsule  Take 1 capsule by mouth daily             furosemide (LASIX) 40 MG tablet  Take 40 mg by mouth 2 times daily             levoFLOXacin (LEVAQUIN) 250 MG tablet  Take 1 tablet by mouth daily for 5 days             lisinopril (PRINIVIL;ZESTRIL) 40 MG tablet  Take 1 tablet by mouth daily             simvastatin (ZOCOR) 20 MG tablet  Take 1 tablet by mouth nightly             spironolactone (ALDACTONE) 25 MG tablet  Take 25 mg by mouth 2 times daily                 Consults:  PT / OT, . Significant Diagnostic Studies:  Labs, UA, Urine Culture, CT head, Xray lumbar spine. Treatments:   IV Levaquin, Tramadol, Toradol, Lidoderm patch, PT / OT. Disposition:   Home. Discharge Instructions:  Continue previous home medication. Take extra strength Tylenol:  2 tablets every 8 hours as needed for pain. Take Levaquin 250 mg daily x 5 days. Activity:  As tolerated with walker and assistance of family at all time. Diet:  General.    Follow up with JAYLAN Delacruz in 1 week. Discharge time =  32 minutes.      SignedMarcos De Jesus Back  5/12/2021, 6:47 AM

## 2021-05-12 NOTE — PROGRESS NOTES
[facility]  Occupational Therapy    Date: 2021  Patient Name: Evelyn Jo        : 1955       [] Pt Refusal           [x] Pt Unavailable due to: Multiple attempts were made to see patient today, pt has been sound asleep all day. Upon arrival this afternoon, patient is still sleeping. Requires VCs from RN to arouse. Pt incontinent of a large amount of urine, requires total assistance in order to complete pericare and to rajeev a clean brief. Does well with bed mobility as she rolls to L and R side with MIN A, requires stability and reassurance once laying on her side that she would not fall. Pt is awake for these tasks, but begins to fall asleep once covered up with a blanket and lights are turned off. Patient unable to engage in further therapy interventions at this time. Will continue to address goals as able. Bed alarm in place, call light and other personal items within reach.      Time in: 1511  Time out: [address], SCHREIBER/L  Date: 2021

## 2021-05-16 LAB
CULTURE: NORMAL
Lab: 10
SPECIMEN DESCRIPTION: NORMAL

## 2021-06-02 ENCOUNTER — APPOINTMENT (OUTPATIENT)
Dept: CT IMAGING | Age: 66
DRG: 689 | End: 2021-06-02
Payer: MEDICARE

## 2021-06-02 ENCOUNTER — APPOINTMENT (OUTPATIENT)
Dept: GENERAL RADIOLOGY | Age: 66
DRG: 689 | End: 2021-06-02
Payer: MEDICARE

## 2021-06-02 ENCOUNTER — HOSPITAL ENCOUNTER (INPATIENT)
Age: 66
LOS: 3 days | Discharge: NURSING, REHAB OR LONG-TERM CARE | DRG: 689 | End: 2021-06-05
Attending: FAMILY MEDICINE | Admitting: INTERNAL MEDICINE
Payer: MEDICARE

## 2021-06-02 DIAGNOSIS — N30.01 ACUTE CYSTITIS WITH HEMATURIA: ICD-10-CM

## 2021-06-02 DIAGNOSIS — I48.91 ATRIAL FIBRILLATION WITH RVR (HCC): Primary | ICD-10-CM

## 2021-06-02 DIAGNOSIS — Z86.79 HISTORY OF ATRIAL FIBRILLATION: ICD-10-CM

## 2021-06-02 DIAGNOSIS — Z79.01 LONG TERM (CURRENT) USE OF ANTICOAGULANTS: ICD-10-CM

## 2021-06-02 LAB
-: ABNORMAL
ABSOLUTE EOS #: 0.1 K/UL (ref 0–0.4)
ABSOLUTE IMMATURE GRANULOCYTE: ABNORMAL K/UL (ref 0–0.3)
ABSOLUTE LYMPH #: 3.4 K/UL (ref 1–4.8)
ABSOLUTE MONO #: 0.7 K/UL (ref 0–1)
ALBUMIN SERPL-MCNC: 3.7 G/DL (ref 3.5–5.2)
ALBUMIN/GLOBULIN RATIO: ABNORMAL (ref 1–2.5)
ALP BLD-CCNC: 147 U/L (ref 35–104)
ALT SERPL-CCNC: 6 U/L (ref 5–33)
AMORPHOUS: ABNORMAL
AMPHETAMINE SCREEN URINE: NEGATIVE
ANION GAP SERPL CALCULATED.3IONS-SCNC: 22 MMOL/L (ref 9–17)
AST SERPL-CCNC: 15 U/L
BACTERIA: ABNORMAL
BARBITURATE SCREEN URINE: NEGATIVE
BASOPHILS # BLD: 1 % (ref 0–2)
BASOPHILS ABSOLUTE: 0.1 K/UL (ref 0–0.2)
BENZODIAZEPINE SCREEN, URINE: NEGATIVE
BILIRUB SERPL-MCNC: 1.06 MG/DL (ref 0.3–1.2)
BILIRUBIN URINE: NEGATIVE
BUN BLDV-MCNC: 28 MG/DL (ref 8–23)
BUN/CREAT BLD: ABNORMAL (ref 9–20)
BUPRENORPHINE URINE: NORMAL
CALCIUM SERPL-MCNC: 9.4 MG/DL (ref 8.6–10.4)
CANNABINOID SCREEN URINE: NEGATIVE
CASTS UA: ABNORMAL /LPF
CHLORIDE BLD-SCNC: 94 MMOL/L (ref 98–107)
CO2: 21 MMOL/L (ref 20–31)
COCAINE METABOLITE, URINE: NEGATIVE
COLOR: YELLOW
COMMENT UA: ABNORMAL
CREAT SERPL-MCNC: 0.94 MG/DL (ref 0.5–0.9)
CRYSTALS, UA: ABNORMAL /HPF
DIFFERENTIAL TYPE: YES
EOSINOPHILS RELATIVE PERCENT: 1 % (ref 0–5)
EPITHELIAL CELLS UA: ABNORMAL /HPF
ETHANOL PERCENT: <0.01 %
ETHANOL: <10 MG/DL
GFR AFRICAN AMERICAN: >60 ML/MIN
GFR NON-AFRICAN AMERICAN: 59 ML/MIN
GFR SERPL CREATININE-BSD FRML MDRD: ABNORMAL ML/MIN/{1.73_M2}
GFR SERPL CREATININE-BSD FRML MDRD: ABNORMAL ML/MIN/{1.73_M2}
GLUCOSE BLD-MCNC: 220 MG/DL (ref 70–99)
GLUCOSE URINE: NEGATIVE
HCT VFR BLD CALC: 53.5 % (ref 36–46)
HEMOGLOBIN: 17.6 G/DL (ref 12–16)
IMMATURE GRANULOCYTES: ABNORMAL %
KETONES, URINE: ABNORMAL
LACTIC ACID, WHOLE BLOOD: NORMAL MMOL/L (ref 0.7–2.1)
LACTIC ACID: 1.9 MMOL/L (ref 0.5–2.2)
LEUKOCYTE ESTERASE, URINE: ABNORMAL
LIPASE: 38 U/L (ref 13–60)
LYMPHOCYTES # BLD: 26 % (ref 15–40)
MAGNESIUM: 1.9 MG/DL (ref 1.6–2.6)
MCH RBC QN AUTO: 28.6 PG (ref 26–34)
MCHC RBC AUTO-ENTMCNC: 32.9 G/DL (ref 31–37)
MCV RBC AUTO: 86.9 FL (ref 80–100)
MDMA URINE: NORMAL
METHADONE SCREEN, URINE: NEGATIVE
METHAMPHETAMINE, URINE: NEGATIVE
MONOCYTES # BLD: 5 % (ref 4–8)
MUCUS: ABNORMAL
NITRITE, URINE: POSITIVE
NRBC AUTOMATED: ABNORMAL PER 100 WBC
OPIATES, URINE: NEGATIVE
OTHER OBSERVATIONS UA: ABNORMAL
OXYCODONE SCREEN URINE: NEGATIVE
PDW BLD-RTO: 15.7 % (ref 12.1–15.2)
PH UA: 5 (ref 5–8)
PHENCYCLIDINE, URINE: NEGATIVE
PLATELET # BLD: 251 K/UL (ref 140–450)
PLATELET ESTIMATE: ABNORMAL
PMV BLD AUTO: ABNORMAL FL (ref 6–12)
POTASSIUM SERPL-SCNC: 3.4 MMOL/L (ref 3.7–5.3)
PROPOXYPHENE, URINE: NEGATIVE
PROTEIN UA: ABNORMAL
RBC # BLD: 6.15 M/UL (ref 4–5.2)
RBC # BLD: ABNORMAL 10*6/UL
RBC UA: ABNORMAL /HPF (ref 0–2)
RENAL EPITHELIAL, UA: ABNORMAL /HPF
SARS-COV-2, RAPID: NOT DETECTED
SEG NEUTROPHILS: 67 % (ref 47–75)
SEGMENTED NEUTROPHILS ABSOLUTE COUNT: 8.6 K/UL (ref 2.5–7)
SODIUM BLD-SCNC: 137 MMOL/L (ref 135–144)
SPECIFIC GRAVITY UA: 1.02 (ref 1–1.03)
SPECIMEN DESCRIPTION: NORMAL
TEST INFORMATION: NORMAL
TOTAL PROTEIN: 7.6 G/DL (ref 6.4–8.3)
TRICHOMONAS: ABNORMAL
TRICYCLIC ANTIDEPRESSANTS, UR: NEGATIVE
TROPONIN INTERP: ABNORMAL
TROPONIN T: ABNORMAL NG/ML
TROPONIN, HIGH SENSITIVITY: 20 NG/L (ref 0–14)
TROPONIN, HIGH SENSITIVITY: 22 NG/L (ref 0–14)
TROPONIN, HIGH SENSITIVITY: 32 NG/L (ref 0–14)
TURBIDITY: ABNORMAL
URINE HGB: ABNORMAL
UROBILINOGEN, URINE: NORMAL
WBC # BLD: 12.9 K/UL (ref 3.5–11)
WBC # BLD: ABNORMAL 10*3/UL
WBC UA: ABNORMAL /HPF
YEAST: ABNORMAL

## 2021-06-02 PROCEDURE — 2580000003 HC RX 258: Performed by: INTERNAL MEDICINE

## 2021-06-02 PROCEDURE — 83735 ASSAY OF MAGNESIUM: CPT

## 2021-06-02 PROCEDURE — 96375 TX/PRO/DX INJ NEW DRUG ADDON: CPT

## 2021-06-02 PROCEDURE — 70450 CT HEAD/BRAIN W/O DYE: CPT

## 2021-06-02 PROCEDURE — 81001 URINALYSIS AUTO W/SCOPE: CPT

## 2021-06-02 PROCEDURE — 87077 CULTURE AEROBIC IDENTIFY: CPT

## 2021-06-02 PROCEDURE — 87086 URINE CULTURE/COLONY COUNT: CPT

## 2021-06-02 PROCEDURE — 80053 COMPREHEN METABOLIC PANEL: CPT

## 2021-06-02 PROCEDURE — 85025 COMPLETE CBC W/AUTO DIFF WBC: CPT

## 2021-06-02 PROCEDURE — 93005 ELECTROCARDIOGRAM TRACING: CPT | Performed by: FAMILY MEDICINE

## 2021-06-02 PROCEDURE — G0480 DRUG TEST DEF 1-7 CLASSES: HCPCS

## 2021-06-02 PROCEDURE — 84484 ASSAY OF TROPONIN QUANT: CPT

## 2021-06-02 PROCEDURE — 36415 COLL VENOUS BLD VENIPUNCTURE: CPT

## 2021-06-02 PROCEDURE — 6360000002 HC RX W HCPCS: Performed by: INTERNAL MEDICINE

## 2021-06-02 PROCEDURE — 6370000000 HC RX 637 (ALT 250 FOR IP): Performed by: FAMILY MEDICINE

## 2021-06-02 PROCEDURE — 83690 ASSAY OF LIPASE: CPT

## 2021-06-02 PROCEDURE — 96374 THER/PROPH/DIAG INJ IV PUSH: CPT

## 2021-06-02 PROCEDURE — 1200000000 HC SEMI PRIVATE

## 2021-06-02 PROCEDURE — 83605 ASSAY OF LACTIC ACID: CPT

## 2021-06-02 PROCEDURE — 2500000003 HC RX 250 WO HCPCS: Performed by: FAMILY MEDICINE

## 2021-06-02 PROCEDURE — 94761 N-INVAS EAR/PLS OXIMETRY MLT: CPT

## 2021-06-02 PROCEDURE — 71045 X-RAY EXAM CHEST 1 VIEW: CPT

## 2021-06-02 PROCEDURE — 80306 DRUG TEST PRSMV INSTRMNT: CPT

## 2021-06-02 PROCEDURE — 87186 SC STD MICRODIL/AGAR DIL: CPT

## 2021-06-02 PROCEDURE — 87635 SARS-COV-2 COVID-19 AMP PRB: CPT

## 2021-06-02 PROCEDURE — 6370000000 HC RX 637 (ALT 250 FOR IP): Performed by: INTERNAL MEDICINE

## 2021-06-02 PROCEDURE — 2580000003 HC RX 258: Performed by: FAMILY MEDICINE

## 2021-06-02 PROCEDURE — 6360000002 HC RX W HCPCS: Performed by: FAMILY MEDICINE

## 2021-06-02 PROCEDURE — 2500000003 HC RX 250 WO HCPCS: Performed by: INTERNAL MEDICINE

## 2021-06-02 PROCEDURE — 99284 EMERGENCY DEPT VISIT MOD MDM: CPT

## 2021-06-02 RX ORDER — ONDANSETRON 2 MG/ML
4 INJECTION INTRAMUSCULAR; INTRAVENOUS EVERY 4 HOURS PRN
Status: DISCONTINUED | OUTPATIENT
Start: 2021-06-02 | End: 2021-06-05 | Stop reason: HOSPADM

## 2021-06-02 RX ORDER — POTASSIUM CHLORIDE 750 MG/1
40 TABLET, FILM COATED, EXTENDED RELEASE ORAL ONCE
Status: COMPLETED | OUTPATIENT
Start: 2021-06-02 | End: 2021-06-02

## 2021-06-02 RX ORDER — ACETAMINOPHEN 325 MG/1
650 TABLET ORAL EVERY 6 HOURS PRN
Status: DISCONTINUED | OUTPATIENT
Start: 2021-06-02 | End: 2021-06-05 | Stop reason: HOSPADM

## 2021-06-02 RX ORDER — DILTIAZEM HYDROCHLORIDE 120 MG/1
120 CAPSULE, COATED, EXTENDED RELEASE ORAL ONCE
Status: COMPLETED | OUTPATIENT
Start: 2021-06-02 | End: 2021-06-02

## 2021-06-02 RX ORDER — SODIUM CHLORIDE 9 MG/ML
INJECTION, SOLUTION INTRAVENOUS CONTINUOUS
Status: DISCONTINUED | OUTPATIENT
Start: 2021-06-02 | End: 2021-06-02

## 2021-06-02 RX ORDER — POLYETHYLENE GLYCOL 3350 17 G/17G
17 POWDER, FOR SOLUTION ORAL DAILY PRN
Status: DISCONTINUED | OUTPATIENT
Start: 2021-06-02 | End: 2021-06-05 | Stop reason: HOSPADM

## 2021-06-02 RX ORDER — 0.9 % SODIUM CHLORIDE 0.9 %
500 INTRAVENOUS SOLUTION INTRAVENOUS ONCE
Status: COMPLETED | OUTPATIENT
Start: 2021-06-02 | End: 2021-06-02

## 2021-06-02 RX ORDER — SPIRONOLACTONE 25 MG/1
25 TABLET ORAL 2 TIMES DAILY
Status: DISCONTINUED | OUTPATIENT
Start: 2021-06-02 | End: 2021-06-05 | Stop reason: HOSPADM

## 2021-06-02 RX ORDER — LISINOPRIL 20 MG/1
40 TABLET ORAL DAILY
Status: DISCONTINUED | OUTPATIENT
Start: 2021-06-02 | End: 2021-06-05 | Stop reason: HOSPADM

## 2021-06-02 RX ORDER — SODIUM CHLORIDE AND POTASSIUM CHLORIDE .9; .15 G/100ML; G/100ML
SOLUTION INTRAVENOUS CONTINUOUS
Status: DISCONTINUED | OUTPATIENT
Start: 2021-06-02 | End: 2021-06-03

## 2021-06-02 RX ORDER — SODIUM CHLORIDE 9 MG/ML
25 INJECTION, SOLUTION INTRAVENOUS PRN
Status: DISCONTINUED | OUTPATIENT
Start: 2021-06-02 | End: 2021-06-05 | Stop reason: HOSPADM

## 2021-06-02 RX ORDER — ATORVASTATIN CALCIUM 20 MG/1
10 TABLET, FILM COATED ORAL DAILY
Status: DISCONTINUED | OUTPATIENT
Start: 2021-06-02 | End: 2021-06-05 | Stop reason: HOSPADM

## 2021-06-02 RX ORDER — ONDANSETRON 2 MG/ML
4 INJECTION INTRAMUSCULAR; INTRAVENOUS EVERY 6 HOURS PRN
Status: DISCONTINUED | OUTPATIENT
Start: 2021-06-02 | End: 2021-06-02

## 2021-06-02 RX ORDER — IBUPROFEN 200 MG
400 TABLET ORAL EVERY 6 HOURS PRN
Status: DISCONTINUED | OUTPATIENT
Start: 2021-06-02 | End: 2021-06-05 | Stop reason: HOSPADM

## 2021-06-02 RX ORDER — IBUPROFEN 200 MG
400 TABLET ORAL EVERY 6 HOURS PRN
COMMUNITY

## 2021-06-02 RX ORDER — DILTIAZEM HYDROCHLORIDE 5 MG/ML
10 INJECTION INTRAVENOUS ONCE
Status: COMPLETED | OUTPATIENT
Start: 2021-06-02 | End: 2021-06-02

## 2021-06-02 RX ORDER — ACETAMINOPHEN 650 MG/1
650 SUPPOSITORY RECTAL EVERY 6 HOURS PRN
Status: DISCONTINUED | OUTPATIENT
Start: 2021-06-02 | End: 2021-06-05 | Stop reason: HOSPADM

## 2021-06-02 RX ORDER — LEVOFLOXACIN 5 MG/ML
250 INJECTION, SOLUTION INTRAVENOUS EVERY 24 HOURS
Status: DISCONTINUED | OUTPATIENT
Start: 2021-06-02 | End: 2021-06-04

## 2021-06-02 RX ORDER — ONDANSETRON 4 MG/1
4 TABLET, ORALLY DISINTEGRATING ORAL EVERY 8 HOURS PRN
Status: DISCONTINUED | OUTPATIENT
Start: 2021-06-02 | End: 2021-06-02

## 2021-06-02 RX ORDER — ONDANSETRON 4 MG/1
4 TABLET, ORALLY DISINTEGRATING ORAL EVERY 8 HOURS PRN
Status: DISCONTINUED | OUTPATIENT
Start: 2021-06-02 | End: 2021-06-05 | Stop reason: HOSPADM

## 2021-06-02 RX ORDER — ACETAMINOPHEN 500 MG
1000 TABLET ORAL EVERY 6 HOURS PRN
COMMUNITY

## 2021-06-02 RX ORDER — DILTIAZEM HYDROCHLORIDE 120 MG/1
120 CAPSULE, COATED, EXTENDED RELEASE ORAL DAILY
Status: DISCONTINUED | OUTPATIENT
Start: 2021-06-02 | End: 2021-06-05 | Stop reason: HOSPADM

## 2021-06-02 RX ORDER — SODIUM CHLORIDE 0.9 % (FLUSH) 0.9 %
5-40 SYRINGE (ML) INJECTION EVERY 12 HOURS SCHEDULED
Status: DISCONTINUED | OUTPATIENT
Start: 2021-06-02 | End: 2021-06-05 | Stop reason: HOSPADM

## 2021-06-02 RX ORDER — CARVEDILOL 12.5 MG/1
6.25 TABLET ORAL 2 TIMES DAILY
Status: DISCONTINUED | OUTPATIENT
Start: 2021-06-02 | End: 2021-06-05 | Stop reason: HOSPADM

## 2021-06-02 RX ORDER — SODIUM CHLORIDE 0.9 % (FLUSH) 0.9 %
5-40 SYRINGE (ML) INJECTION PRN
Status: DISCONTINUED | OUTPATIENT
Start: 2021-06-02 | End: 2021-06-05 | Stop reason: HOSPADM

## 2021-06-02 RX ADMIN — DILTIAZEM HYDROCHLORIDE 10 MG: 5 INJECTION INTRAVENOUS at 19:49

## 2021-06-02 RX ADMIN — DILTIAZEM HYDROCHLORIDE 10 MG: 5 INJECTION INTRAVENOUS at 17:05

## 2021-06-02 RX ADMIN — LISINOPRIL 40 MG: 20 TABLET ORAL at 19:28

## 2021-06-02 RX ADMIN — DILTIAZEM HYDROCHLORIDE 120 MG: 120 CAPSULE, COATED, EXTENDED RELEASE ORAL at 19:29

## 2021-06-02 RX ADMIN — LEVOFLOXACIN 250 MG: 5 INJECTION, SOLUTION INTRAVENOUS at 19:30

## 2021-06-02 RX ADMIN — SODIUM CHLORIDE: 9 INJECTION, SOLUTION INTRAVENOUS at 18:53

## 2021-06-02 RX ADMIN — DILTIAZEM HYDROCHLORIDE 120 MG: 120 CAPSULE, COATED, EXTENDED RELEASE ORAL at 18:03

## 2021-06-02 RX ADMIN — POTASSIUM CHLORIDE 40 MEQ: 750 TABLET, FILM COATED, EXTENDED RELEASE ORAL at 19:28

## 2021-06-02 RX ADMIN — ONDANSETRON 4 MG: 2 INJECTION INTRAMUSCULAR; INTRAVENOUS at 19:48

## 2021-06-02 RX ADMIN — CEFTRIAXONE SODIUM 1000 MG: 1 INJECTION, POWDER, FOR SOLUTION INTRAMUSCULAR; INTRAVENOUS at 17:57

## 2021-06-02 RX ADMIN — SODIUM CHLORIDE, PRESERVATIVE FREE 10 ML: 5 INJECTION INTRAVENOUS at 21:37

## 2021-06-02 RX ADMIN — POTASSIUM CHLORIDE AND SODIUM CHLORIDE: 900; 150 INJECTION, SOLUTION INTRAVENOUS at 21:36

## 2021-06-02 RX ADMIN — SODIUM CHLORIDE 500 ML: 9 INJECTION, SOLUTION INTRAVENOUS at 16:37

## 2021-06-02 ASSESSMENT — PAIN DESCRIPTION - DESCRIPTORS
DESCRIPTORS: PATIENT UNABLE TO DESCRIBE

## 2021-06-02 ASSESSMENT — PAIN SCALES - PAIN ASSESSMENT IN ADVANCED DEMENTIA (PAINAD)
CONSOLABILITY: 1
NEGVOCALIZATION: 1
NEGVOCALIZATION: 1
TOTALSCORE: 5
CONSOLABILITY: 1
FACIALEXPRESSION: 0
FACIALEXPRESSION: 1
BREATHING: 0
CONSOLABILITY: 1
FACIALEXPRESSION: 0
NEGVOCALIZATION: 1
BODYLANGUAGE: 0
BREATHING: 0
BREATHING: 1
CONSOLABILITY: 0
TOTALSCORE: 2
FACIALEXPRESSION: 0
BODYLANGUAGE: 0
BODYLANGUAGE: 1
TOTALSCORE: 0
CONSOLABILITY: 1
BODYLANGUAGE: 1
NEGVOCALIZATION: 1
NEGVOCALIZATION: 0
FACIALEXPRESSION: 1
TOTALSCORE: 2
BREATHING: 0
TOTALSCORE: 4
BREATHING: 0
BODYLANGUAGE: 0

## 2021-06-02 ASSESSMENT — PAIN DESCRIPTION - LOCATION
LOCATION: BACK

## 2021-06-02 ASSESSMENT — PAIN DESCRIPTION - PAIN TYPE
TYPE: CHRONIC PAIN

## 2021-06-02 ASSESSMENT — PAIN SCALES - GENERAL
PAINLEVEL_OUTOF10: 4
PAINLEVEL_OUTOF10: 5
PAINLEVEL_OUTOF10: 2
PAINLEVEL_OUTOF10: 5
PAINLEVEL_OUTOF10: 0

## 2021-06-02 ASSESSMENT — PAIN DESCRIPTION - ORIENTATION
ORIENTATION: LOWER

## 2021-06-02 NOTE — PROGRESS NOTES
Pts daughter, Ilana Hinojosa, here. Able to finish admission questions with daughter. States that patient has been like this since she was discharged 3 weeks ago. Daughter states that tries to give her her pills and sometimes when she walks away the patient doesn't take them. Pt has been difficult to get out of bed. Daughter states they do not want nursing home placement but are willing to talk to  about their options. Consult placed. Report given to night shift.

## 2021-06-02 NOTE — PROGRESS NOTES
Pt to floor from ED to room 268. Transferred to bed with 3 RN's. Pt alert to self and place only but reorients easily. Per pt, she has not been eating or drinking much for a couple of days. States her daughter tries to help her when she can. Admission assessment completed. Pt oriented to room, call light, and surroundings. Bed alarm on for safety and call light in reach.

## 2021-06-02 NOTE — ED PROVIDER NOTES
[facility]  eMERGENCY dEPARTMENT eNCOUnter          CHIEF COMPLAINT       Chief Complaint   Patient presents with    Fatigue     Pt was recently discharged from hospital for UTI, per pt family she hasnt been herself since she was discharged home. Pt hasnt had an appetite for past 4-5 days.  Altered Mental Status       Nurses Notes reviewed and I agree except as noted in the HPI. HISTORY OF PRESENT ILLNESS    Diane Caceres is a 77 y.o. female who presents to the emergency room via EMS from home, as reported patient having altered mental status per family, EMS reports that daughter states patient was discharged in the hospital after urinary tract infection, states patient has been \"not self\" since that time, the past 4 to 5 days patient had decreased food and fluid intake. Patient denies any pain, does states she had a fall though she states it was a long time ago, does now that she is had urinary infections in the past but she denies any current dysuria, no reported vomiting or diarrhea. PCP: JAYLAN Byrne    REVIEW OF SYSTEMS     Review of Systems   Unable to perform ROS: Mental status change   Neurological: Positive for weakness. PAST MEDICAL HISTORY    has a past medical history of Atrial fibrillation ([facility] [address] Cerebral artery occlusion with cerebral infarction [facility]), Difficult intravenous access, Hydrocephalus ([facility] [address] Hyperlipidemia, Hypertension, MRSA (methicillin resistant staph aureus) culture positive, Smoker, TIA (transient ischemic attack), and Wears glasses. SURGICAL HISTORY      has a past surgical history that includes knee surgery (Right, 2008); knee surgery (Right); Tubal ligation (1984); Ventriculoperitoneal shunt (Right, 12/14/2016); and other surgical history (Right, 12/14/2016).     CURRENT MEDICATIONS       Previous Medications    ACETAMINOPHEN (TYLENOL) 500 MG TABLET    Take 1,000 mg by mouth every 6 hours as needed for Pain    APIXABAN (ELIQUIS) 5 ), incorrect day . Patient appears well-developed and well-nourished. Patient is active and cooperative. HENT:   Head: Normocephalic and atraumatic. Head is without contusion. Right Ear: Hearing and external ear normal. No drainage. Left Ear: Hearing and external ear normal. No drainage. Nose: Nose normal. No nasal deformity. No epistaxis. Mouth/Throat: Mucous membranes are not dry. Eyes: EOMI. Conjunctivae, sclera, and lids are normal. Right eye exhibits no discharge. Left eye exhibits no discharge. Neck: Full passive range of motion without pain and phonation normal.   Cardiovascular:  Increased rate, irregular rhythm and intact distal pulses. No gross lower extremity edema. Pulses: Right radial pulse  2+   Pulmonary/Chest: Effort normal. No tachypnea and no bradypnea. No wheezes, rhonchi, or rales. Abdominal: Soft. Patient without distension, tenderness palpation right mid abdomen with voluntary guarding  Musculoskeletal:   Negative acute trauma or deformity,  apparent full range of motion and normal strength all extremities appropriate to age. Neurological: Patient is alert and oriented to person (name,  correct), place (hospital, [facility] instead of ), incorrect day. patient displays no tremor. Patient displays no seizure activity. Skin: Skin is warm and dry. Patient is not diaphoretic. Psychiatric: Patient has a normal mood and affect. Patient speech is normal and behavior is normal. Cognition and memory are normal.    DIFFERENTIAL DIAGNOSIS:   UTI, infection NOS, SDAT, ICH/TIA/CVA, encephalopathy NOS, fatigue, dehydration, DAVID    DIAGNOSTIC RESULTS     EKG: All EKG's are interpreted by the Emergency Department Physician who either signs or Co-signs this chart in the absence of a cardiologist.  EKG    The patient had an EKG which is interpreted by me in the absence of a Cardiologist.   [] Without comparison to previous.    [x] With comparison to a previous EKG Dated 5/8/2021, 10/16/2020    EKG @ 1641 hrs - AFRVR, rate 138, normal axis, QRS 96 , noted LVH, prior EKG shows similar AF RVR pattern, EKG 10/2020 shows atrial fibrillation without significant change in morphology save rate      RADIOLOGY: non-plain film images(s) such as CT, Ultrasound and MRI are read by the radiologist.  XR CHEST PORTABLE   Final Result       shunt tubing over the right chest. No acute change. CT Head WO Contrast   Final Result      1. Despite the ventriculostomy tube, no significant interval change in    prominent ventricles. 2. No definite acute infarct, hemorrhage or acute intracranial process    otherwise.                    LABS:   Labs Reviewed   COMPREHENSIVE METABOLIC PANEL W/ REFLEX TO MG FOR LOW K - Abnormal; Notable for the following components:       Result Value    Glucose 220 (*)     BUN 28 (*)     CREATININE 0.94 (*)     Potassium 3.4 (*)     Chloride 94 (*)     Anion Gap 22 (*)     Alkaline Phosphatase 147 (*)     GFR Non- 59 (*)     All other components within normal limits   CBC WITH AUTO DIFFERENTIAL - Abnormal; Notable for the following components:    WBC 12.9 (*)     RBC 6.15 (*)     Hemoglobin 17.6 (*)     Hematocrit 53.5 (*)     RDW 15.7 (*)     Segs Absolute 8.60 (*)     All other components within normal limits   URINALYSIS - Abnormal; Notable for the following components:    Turbidity UA CLOUDY (*)     Ketones, Urine TRACE (*)     Urine Hgb 1+ (*)     Protein, UA 2+ (*)     Nitrite, Urine POSITIVE (*)     Leukocyte Esterase, Urine 3+ (*)     All other components within normal limits   TROPONIN - Abnormal; Notable for the following components:    Troponin, High Sensitivity 32 (*)     All other components within normal limits   MICROSCOPIC URINALYSIS - Abnormal; Notable for the following components:    Bacteria, UA 4+ (*)     All other components within normal limits   COVID-19, RAPID   CULTURE, URINE   LIPASE   URINE DRUG SCREEN ETHANOL   MAGNESIUM   TROPONIN       EMERGENCY DEPARTMENT COURSE:   Vitals:    Vitals:    06/02/21 1700 06/02/21 1704 06/02/21 1719 06/02/21 1735   BP: (!) 166/87 (!) 165/98 126/76 (!) 140/85   Pulse:  139 132 108   Resp:  27 23 26   Temp:       TempSrc:       SpO2:  99% 100% 98%   Weight:       Height:         Patient history and physical exam taken at bedside, discussed patient symptoms and exam findings, discussed initial work-up to include EKG, EMS had established IV will use her blood for initial blood work, urine sample, would like to get CT head, chest x-ray. Patient placed on cardiac monitor, will continue IV fluids with additional 500 cc fluid bolus as EMS is already given to 50 cc, pulse oximetry, patient sitting semi-Fowlers in bed, acknowledged    EMR reviewed, patient was admitted to the hospital 5/85/12/2021, discharge diagnoses of UTI, mental status change, AF RVR, does mention a fall 5/3/2021 with resultant low back pain since that time.     EMR reviewed, noting history of UTIs, normal pressure hydrocephalus with VPS, encephalopathy, HTN, HLD, AF and takes Eliquis and diltiazem, CVA/TIA, CHF    EKG as above    Patient heart rate was observed by nursing about 180s 190, patient was having some discomfort at the time, the heart rate decreased back to 130s140s in AF RVR pattern, will continue observe patient closely, did order diltiazem 10 mg IV push    Patient is now agreeable to urine cath for urine sample, as per DENIZ Tamez    Nursing straight cathed for urine sample, does appear very cloudy with slight odor, awaiting results from lab    Chest x-ray radiology report reviewed, CT radiology report reviewed    Initial labs reviewed, WBC 12.9 with 60% PMNs no reported bands, H&H 17.6/53.5 likely concentrated, , BUN 28, SCR 0.94, , K3.4, CO2 21, normal liver enzymes and bilirubins, normal lipase, negative EtOH, noted hs-Medina of 32 at 1640 hrs    We will repeat hs-Medina at 1 hour, however this is (Please note that portions of this note were completed with a voice recognition program.  Efforts were made to edit the dictations but occasionally words are mis-transcribed.)    MD Andreia Huynh MD  06/02/21 5730

## 2021-06-03 LAB
ABSOLUTE EOS #: 0.1 K/UL (ref 0–0.4)
ABSOLUTE IMMATURE GRANULOCYTE: ABNORMAL K/UL (ref 0–0.3)
ABSOLUTE LYMPH #: 2.3 K/UL (ref 1–4.8)
ABSOLUTE MONO #: 0.8 K/UL (ref 0–1)
ANION GAP SERPL CALCULATED.3IONS-SCNC: 9 MMOL/L (ref 9–17)
BASOPHILS # BLD: 3 % (ref 0–2)
BASOPHILS ABSOLUTE: 0.3 K/UL (ref 0–0.2)
BUN BLDV-MCNC: 24 MG/DL (ref 8–23)
BUN/CREAT BLD: 32 (ref 9–20)
CALCIUM SERPL-MCNC: 8 MG/DL (ref 8.6–10.4)
CHLORIDE BLD-SCNC: 105 MMOL/L (ref 98–107)
CO2: 25 MMOL/L (ref 20–31)
CREAT SERPL-MCNC: 0.76 MG/DL (ref 0.5–0.9)
DIFFERENTIAL TYPE: YES
EKG ATRIAL RATE: 125 BPM
EKG Q-T INTERVAL: 316 MS
EKG QRS DURATION: 96 MS
EKG QTC CALCULATION (BAZETT): 478 MS
EKG R AXIS: 29 DEGREES
EKG T AXIS: -153 DEGREES
EKG VENTRICULAR RATE: 138 BPM
EOSINOPHILS RELATIVE PERCENT: 1 % (ref 0–5)
GFR AFRICAN AMERICAN: >60 ML/MIN
GFR NON-AFRICAN AMERICAN: >60 ML/MIN
GFR SERPL CREATININE-BSD FRML MDRD: ABNORMAL ML/MIN/{1.73_M2}
GFR SERPL CREATININE-BSD FRML MDRD: ABNORMAL ML/MIN/{1.73_M2}
GLUCOSE BLD-MCNC: 100 MG/DL (ref 70–99)
HCT VFR BLD CALC: 43.2 % (ref 36–46)
HEMOGLOBIN: 14.4 G/DL (ref 12–16)
IMMATURE GRANULOCYTES: ABNORMAL %
LACTIC ACID, WHOLE BLOOD: NORMAL MMOL/L (ref 0.7–2.1)
LACTIC ACID, WHOLE BLOOD: NORMAL MMOL/L (ref 0.7–2.1)
LACTIC ACID: 1.3 MMOL/L (ref 0.5–2.2)
LACTIC ACID: 1.3 MMOL/L (ref 0.5–2.2)
LV EF: 48 %
LVEF MODALITY: NORMAL
LYMPHOCYTES # BLD: 23 % (ref 15–40)
MCH RBC QN AUTO: 28.8 PG (ref 26–34)
MCHC RBC AUTO-ENTMCNC: 33.2 G/DL (ref 31–37)
MCV RBC AUTO: 86.9 FL (ref 80–100)
MONOCYTES # BLD: 8 % (ref 4–8)
NRBC AUTOMATED: ABNORMAL PER 100 WBC
PDW BLD-RTO: 15.8 % (ref 12.1–15.2)
PLATELET # BLD: 181 K/UL (ref 140–450)
PLATELET ESTIMATE: ABNORMAL
PMV BLD AUTO: ABNORMAL FL (ref 6–12)
POTASSIUM SERPL-SCNC: 4.4 MMOL/L (ref 3.7–5.3)
RBC # BLD: 4.98 M/UL (ref 4–5.2)
RBC # BLD: ABNORMAL 10*6/UL
SEG NEUTROPHILS: 65 % (ref 47–75)
SEGMENTED NEUTROPHILS ABSOLUTE COUNT: 6.5 K/UL (ref 2.5–7)
SODIUM BLD-SCNC: 139 MMOL/L (ref 135–144)
TROPONIN INTERP: ABNORMAL
TROPONIN INTERP: ABNORMAL
TROPONIN T: ABNORMAL NG/ML
TROPONIN T: ABNORMAL NG/ML
TROPONIN, HIGH SENSITIVITY: 21 NG/L (ref 0–14)
TROPONIN, HIGH SENSITIVITY: 23 NG/L (ref 0–14)
WBC # BLD: 9.9 K/UL (ref 3.5–11)
WBC # BLD: ABNORMAL 10*3/UL

## 2021-06-03 PROCEDURE — 2580000003 HC RX 258: Performed by: INTERNAL MEDICINE

## 2021-06-03 PROCEDURE — 80048 BASIC METABOLIC PNL TOTAL CA: CPT

## 2021-06-03 PROCEDURE — 97162 PT EVAL MOD COMPLEX 30 MIN: CPT

## 2021-06-03 PROCEDURE — 1200000000 HC SEMI PRIVATE

## 2021-06-03 PROCEDURE — 97166 OT EVAL MOD COMPLEX 45 MIN: CPT

## 2021-06-03 PROCEDURE — 99222 1ST HOSP IP/OBS MODERATE 55: CPT | Performed by: INTERNAL MEDICINE

## 2021-06-03 PROCEDURE — 92610 EVALUATE SWALLOWING FUNCTION: CPT

## 2021-06-03 PROCEDURE — 6370000000 HC RX 637 (ALT 250 FOR IP): Performed by: INTERNAL MEDICINE

## 2021-06-03 PROCEDURE — 85025 COMPLETE CBC W/AUTO DIFF WBC: CPT

## 2021-06-03 PROCEDURE — 93010 ELECTROCARDIOGRAM REPORT: CPT | Performed by: INTERNAL MEDICINE

## 2021-06-03 PROCEDURE — 83605 ASSAY OF LACTIC ACID: CPT

## 2021-06-03 PROCEDURE — 84484 ASSAY OF TROPONIN QUANT: CPT

## 2021-06-03 PROCEDURE — 36415 COLL VENOUS BLD VENIPUNCTURE: CPT

## 2021-06-03 PROCEDURE — 94761 N-INVAS EAR/PLS OXIMETRY MLT: CPT

## 2021-06-03 PROCEDURE — 6360000002 HC RX W HCPCS: Performed by: INTERNAL MEDICINE

## 2021-06-03 RX ORDER — DIPHENHYDRAMINE HYDROCHLORIDE 50 MG/ML
25 INJECTION INTRAMUSCULAR; INTRAVENOUS EVERY 4 HOURS PRN
Status: DISCONTINUED | OUTPATIENT
Start: 2021-06-03 | End: 2021-06-05 | Stop reason: HOSPADM

## 2021-06-03 RX ADMIN — DIPHENHYDRAMINE HYDROCHLORIDE 25 MG: 50 INJECTION, SOLUTION INTRAMUSCULAR; INTRAVENOUS at 21:15

## 2021-06-03 RX ADMIN — CARVEDILOL 6.25 MG: 12.5 TABLET, FILM COATED ORAL at 09:19

## 2021-06-03 RX ADMIN — SODIUM CHLORIDE, PRESERVATIVE FREE 10 ML: 5 INJECTION INTRAVENOUS at 21:21

## 2021-06-03 RX ADMIN — ATORVASTATIN CALCIUM 10 MG: 20 TABLET, FILM COATED ORAL at 09:19

## 2021-06-03 RX ADMIN — LISINOPRIL 40 MG: 20 TABLET ORAL at 09:19

## 2021-06-03 RX ADMIN — IBUPROFEN 400 MG: 200 TABLET, FILM COATED ORAL at 20:50

## 2021-06-03 RX ADMIN — POTASSIUM CHLORIDE AND SODIUM CHLORIDE: 900; 150 INJECTION, SOLUTION INTRAVENOUS at 04:48

## 2021-06-03 RX ADMIN — LEVOFLOXACIN 250 MG: 5 INJECTION, SOLUTION INTRAVENOUS at 18:50

## 2021-06-03 RX ADMIN — APIXABAN 5 MG: 5 TABLET, FILM COATED ORAL at 09:18

## 2021-06-03 RX ADMIN — SPIRONOLACTONE 25 MG: 25 TABLET, FILM COATED ORAL at 09:19

## 2021-06-03 RX ADMIN — APIXABAN 5 MG: 5 TABLET, FILM COATED ORAL at 20:50

## 2021-06-03 RX ADMIN — DILTIAZEM HYDROCHLORIDE 120 MG: 120 CAPSULE, COATED, EXTENDED RELEASE ORAL at 09:18

## 2021-06-03 RX ADMIN — SPIRONOLACTONE 25 MG: 25 TABLET, FILM COATED ORAL at 20:50

## 2021-06-03 RX ADMIN — SODIUM CHLORIDE, PRESERVATIVE FREE 20 ML: 5 INJECTION INTRAVENOUS at 09:19

## 2021-06-03 RX ADMIN — CARVEDILOL 6.25 MG: 12.5 TABLET, FILM COATED ORAL at 20:50

## 2021-06-03 RX ADMIN — DIPHENHYDRAMINE HYDROCHLORIDE 25 MG: 50 INJECTION, SOLUTION INTRAMUSCULAR; INTRAVENOUS at 00:29

## 2021-06-03 RX ADMIN — SODIUM CHLORIDE, PRESERVATIVE FREE 10 ML: 5 INJECTION INTRAVENOUS at 18:50

## 2021-06-03 ASSESSMENT — PAIN DESCRIPTION - LOCATION
LOCATION: BACK

## 2021-06-03 ASSESSMENT — PAIN DESCRIPTION - ORIENTATION
ORIENTATION: LOWER

## 2021-06-03 ASSESSMENT — PAIN SCALES - PAIN ASSESSMENT IN ADVANCED DEMENTIA (PAINAD)
NEGVOCALIZATION: 0
FACIALEXPRESSION: 0
BREATHING: 0
TOTALSCORE: 0
TOTALSCORE: 1
BODYLANGUAGE: 0
TOTALSCORE: 0
FACIALEXPRESSION: 0
TOTALSCORE: 0
BODYLANGUAGE: 0
NEGVOCALIZATION: 1
TOTALSCORE: 2
CONSOLABILITY: 0
CONSOLABILITY: 1
NEGVOCALIZATION: 0
NEGVOCALIZATION: 1
NEGVOCALIZATION: 0
NEGVOCALIZATION: 0
CONSOLABILITY: 0
CONSOLABILITY: 1
BODYLANGUAGE: 0
BREATHING: 0
BODYLANGUAGE: 0
CONSOLABILITY: 0
TOTALSCORE: 0
BODYLANGUAGE: 0
BODYLANGUAGE: 0
TOTALSCORE: 0
TOTALSCORE: 0
TOTALSCORE: 2
NEGVOCALIZATION: 0
BODYLANGUAGE: 0
NEGVOCALIZATION: 1
CONSOLABILITY: 1
BREATHING: 0
CONSOLABILITY: 0
BREATHING: 0
FACIALEXPRESSION: 0
TOTALSCORE: 0
FACIALEXPRESSION: 0
BREATHING: 0
BREATHING: 0
TOTALSCORE: 0
FACIALEXPRESSION: 0
CONSOLABILITY: 0
BODYLANGUAGE: 0
FACIALEXPRESSION: 0
FACIALEXPRESSION: 0
BREATHING: 0
BREATHING: 0
BODYLANGUAGE: 0
NEGVOCALIZATION: 1
BREATHING: 0
BREATHING: 0
NEGVOCALIZATION: 0
TOTALSCORE: 2
FACIALEXPRESSION: 0
NEGVOCALIZATION: 0
TOTALSCORE: 1
TOTALSCORE: 0
CONSOLABILITY: 0
BREATHING: 0
BODYLANGUAGE: 0
CONSOLABILITY: 0
BODYLANGUAGE: 0
NEGVOCALIZATION: 1
BODYLANGUAGE: 0
NEGVOCALIZATION: 1
BODYLANGUAGE: 0
FACIALEXPRESSION: 0
CONSOLABILITY: 0
FACIALEXPRESSION: 0
BREATHING: 0
FACIALEXPRESSION: 0
NEGVOCALIZATION: 0
BREATHING: 0
CONSOLABILITY: 0
BREATHING: 0
FACIALEXPRESSION: 0
BODYLANGUAGE: 0
FACIALEXPRESSION: 0
CONSOLABILITY: 1
TOTALSCORE: 2
NEGVOCALIZATION: 0
FACIALEXPRESSION: 0
BREATHING: 0
FACIALEXPRESSION: 0
BODYLANGUAGE: 0

## 2021-06-03 ASSESSMENT — PAIN - FUNCTIONAL ASSESSMENT: PAIN_FUNCTIONAL_ASSESSMENT: 0-10

## 2021-06-03 ASSESSMENT — PAIN SCALES - GENERAL
PAINLEVEL_OUTOF10: 2
PAINLEVEL_OUTOF10: 1
PAINLEVEL_OUTOF10: 2
PAINLEVEL_OUTOF10: 0
PAINLEVEL_OUTOF10: 2
PAINLEVEL_OUTOF10: 0
PAINLEVEL_OUTOF10: 0
PAINLEVEL_OUTOF10: 2
PAINLEVEL_OUTOF10: 0
PAINLEVEL_OUTOF10: 0
PAINLEVEL_OUTOF10: 1
PAINLEVEL_OUTOF10: 0

## 2021-06-03 ASSESSMENT — PAIN DESCRIPTION - DESCRIPTORS
DESCRIPTORS: PATIENT UNABLE TO DESCRIBE

## 2021-06-03 ASSESSMENT — PAIN DESCRIPTION - PAIN TYPE
TYPE: CHRONIC PAIN

## 2021-06-03 NOTE — DISCHARGE INSTR - COC
Continuity of Care Form    Patient Name: Aracely Novak   :  1955  MRN:  [id number]    Admit date:  2021  Discharge date:  ***    Code Status Order: Full Code   Advance Directives:   Advance Care Flowsheet Documentation     Date/Time Healthcare Directive Type of Healthcare Directive Copy in [address] Agent's Name Healthcare Agent's Phone Number    21 [phone]  No, patient does not have an advance directive for healthcare treatment -- -- -- -- --          Admitting Physician:  Tonia Trevino MD  PCP: JAYLAN Rojo    Discharging Nurse: [facility] Unit/Room#: 7380/1998-60  Discharging Unit Phone Number: ***    Emergency Contact:   Extended Emergency Contact Information  Primary Emergency Contact: [place] [address]  Phone: [phone]  Relation: Child  Secondary Emergency Contact: [address] Phone: [phone]  Relation: Child    Past Surgical History:  Past Surgical History:   Procedure Laterality Date    KNEE SURGERY Right 2008    KNEE SURGERY Right     OVER 20 YRS AGO    OTHER SURGICAL HISTORY Right 2016     shunt     TUBAL LIGATION  1984    VENTRICULOPERITONEAL SHUNT Right 2016       Immunization History: There is no immunization history on file for this patient.     Active Problems:  Patient Active Problem List   Diagnosis Code    Acute cystitis without hematuria N30.00    Encephalopathy acute G93.40    Simple chronic bronchitis ([facility] [address] J41.0    Essential hypertension I10    Normal pressure hydrocephalus (HCC) G91.2    Altered mental status R41.82    Abnormal head CT R93.0    Hypertensive emergency I16.1    Urinary tract infection without hematuria N39.0    Abnormal MRI R93.89    Cerebrovascular disease I67.9    Closed fracture of single pubic ramus of pelvis, right, initial encounter ([facility] [address] S32.591A    UTI (urinary tract infection) N39.0    Atrial fibrillation with RVR ([address] I48.91       Isolation/Infection:   Isolation          Contact        Patient Infection Status     Infection Onset Added Last Indicated Last Indicated By Review Planned Expiration Resolved Resolved By    MRSA  07/03/18 07/03/18 Grey Chung RN        Neck - 7/2018    Resolved    COVID-19 Rule Out 09/24/20 09/24/20 09/24/20 COVID-19 (Ordered)   10/02/20 Rule-Out Test Resulted          Nurse Assessment:  Last Vital Signs: BP (!) 141/74   Pulse 85   Temp 98 °F (36.7 °C) (Oral)   Resp 18   Ht 5' 2\" (1.575 m)   Wt 120 lb 6.4 oz (54.6 kg)   LMP 12/13/1990 (Approximate)   SpO2 95%   BMI 22.02 kg/m²     Last documented pain score (0-10 scale): Pain Level: 0  Last Weight:   Wt Readings from Last 1 Encounters:   06/02/21 120 lb 6.4 oz (54.6 kg)     Mental Status:  disoriented, oriented and alert    IV Access:  - None    Nursing Mobility/ADLs:  Walking   Dependent  Transfer  Dependent  Bathing  Dependent  Dressing  Dependent  Toileting  Dependent  Feeding  Independent  Med Admin  Assisted  Med Delivery   none and whole    Wound Care Documentation and Therapy:        Elimination:  Continence:   · Bowel: No  · Bladder: No  Urinary Catheter: None   Colostomy/Ileostomy/Ileal Conduit: No       Date of Last BM: ***    Intake/Output Summary (Last 24 hours) at 6/3/2021 1357  Last data filed at 6/3/2021 1231  Gross per 24 hour   Intake 500 ml   Output    Net 500 ml     No intake/output data recorded. Safety Concerns: At Risk for Falls    Impairments/Disabilities:      None    Nutrition Therapy:  Current Nutrition Therapy:   - Oral Diet:  Dental Soft  - Oral Nutrition Supplement:  Standard  three times a day    Routes of Feeding: Oral  Liquids:  Thin Liquids  Daily Fluid Restriction: no  Last Modified Barium Swallow with Video (Video Swallowing Test): done on 6/3/2021/***Supervision from distance, mild dysphagia    Treatments at the Time of Hospital Discharge:   Respiratory Treatments: ***  Oxygen Therapy:  is not on

## 2021-06-03 NOTE — PLAN OF CARE
Problem: Falls - Risk of:  Goal: Will remain free from falls  Description: Will remain free from falls  6/2/2021 2308 by Cadence Florentino RN  Outcome: Ongoing  6/2/2021 2308 by Cadence Florentino RN  Outcome: Ongoing  Goal: Absence of physical injury  Description: Absence of physical injury  6/2/2021 2308 by Cadence Florentino RN  Outcome: Ongoing  6/2/2021 2308 by Cadence Florentino RN  Outcome: Ongoing     Problem: Skin Integrity:  Goal: Will show no infection signs and symptoms  Description: Will show no infection signs and symptoms  6/2/2021 2308 by Cadence Florentino RN  Outcome: Ongoing  6/2/2021 2308 by Cadence Florentino RN  Outcome: Ongoing  Goal: Absence of new skin breakdown  Description: Absence of new skin breakdown  6/2/2021 2308 by Cadence Florentino RN  Outcome: Ongoing  6/2/2021 2308 by Cadence Florentino RN  Outcome: Ongoing     Problem: SAFETY  Goal: Free from accidental physical injury  6/2/2021 2308 by Cadence Florentino RN  Outcome: Ongoing  6/2/2021 2308 by Cadence Florentino RN  Outcome: Ongoing  Goal: Free from intentional harm  6/2/2021 2308 by Cadence Florentino RN  Outcome: Ongoing  6/2/2021 2308 by Cadence Florentino RN  Outcome: Ongoing     Problem: DAILY CARE  Goal: Daily care needs are met  6/2/2021 2308 by Cadence Florentino RN  Outcome: Ongoing  6/2/2021 2308 by Cadence Florentino RN  Outcome: Ongoing     Problem: PAIN  Goal: Patient's pain/discomfort is manageable  6/2/2021 2308 by Cadence Florentino RN  Outcome: Ongoing  6/2/2021 2308 by Cadence Florentino RN  Outcome: Ongoing     Problem: SKIN INTEGRITY  Goal: Skin integrity is maintained or improved  6/2/2021 2308 by Cadence Florentino RN  Outcome: Ongoing  6/2/2021 2308 by Cadence Florentino RN  Outcome: Ongoing     Problem: KNOWLEDGE DEFICIT  Goal: Patient/S.O. demonstrates understanding of disease process, treatment plan, medications, and discharge instructions.   6/2/2021 2308 by Cadence Florentino RN  Outcome: Ongoing  6/2/2021 2308 by Cadence Florentino RN  Outcome: Ongoing     Problem: DISCHARGE BARRIERS  Goal: Patient's continuum of care needs are met 6/2/2021 2308 by Myrtle Garcia RN  Outcome: Ongoing  6/2/2021 2308 by Myrtle Garcia RN  Outcome: Ongoing     Problem: Sensory:  Goal: General experience of comfort will improve  Description: General experience of comfort will improve  Outcome: Ongoing     Problem: Urinary Elimination:  Goal: Signs and symptoms of infection will decrease  Description: Signs and symptoms of infection will decrease  Outcome: Ongoing  Goal: Ability to reestablish a normal urinary elimination pattern will improve - after catheter removal  Description: Ability to reestablish a normal urinary elimination pattern will improve  Outcome: Ongoing  Goal: Complications related to the disease process, condition or treatment will be avoided or minimized  Description: Complications related to the disease process, condition or treatment will be avoided or minimized  Outcome: Ongoing     Problem: Mental Status - Impaired:  Goal: Mental status will improve  Description: Mental status will improve  Outcome: Ongoing     Problem: Pain:  Goal: Pain level will decrease  Description: Pain level will decrease  Outcome: Ongoing  Goal: Control of acute pain  Description: Control of acute pain  Outcome: Ongoing  Goal: Control of chronic pain  Description: Control of chronic pain  Outcome: Ongoing

## 2021-06-03 NOTE — PLAN OF CARE
Problem: Falls - Risk of:  Goal: Will remain free from falls  Description: Will remain free from falls  6/3/2021 0731 by Fermín Davis RN  Outcome: Ongoing    Problem: Skin Integrity:  Goal: Absence of new skin breakdown  Description: Absence of new skin breakdown  6/3/2021 0731 by Fermín Davis RN  Outcome: Ongoing     Problem: PAIN  Goal: Patient's pain/discomfort is manageable  6/3/2021 0731 by Fermín Davis RN  Outcome: Ongoing     Problem: KNOWLEDGE DEFICIT  Goal: Patient/S.O. demonstrates understanding of disease process, treatment plan, medications, and discharge instructions.   6/3/2021 0731 by Fermín Davis RN  Outcome: Ongoing     Problem: Sensory:  Goal: General experience of comfort will improve  Description: General experience of comfort will improve  6/3/2021 0731 by Fermín Davis RN  Outcome: Ongoing

## 2021-06-03 NOTE — CONSULTS
[place] [address]                                  CONSULTATION    PATIENT NAME: Maryse Calderon                    :        1955  MED REC NO:   [id number]                              ROOM:       7829  ACCOUNT NO:   [de-identified]                           ADMIT DATE: 2021  PROVIDER:     Shawna Laws    CONSULT DATE:  2021    REASON FOR CONSULT:  Atrial fibrillation with RVR. HISTORY OF PRESENT ILLNESS:  The patient is a pleasant 24-year-old  somewhat demented female who lives with her daughter, Santos Mario. She has a  history of chronic atrial fibrillation, which appears that it was  discovered in approximately 2020. I have very little background  data. She has seen a cardiologist and I was able to find one cardiology  consult from 2020. She does have a history of moderate-to-severe cardiomyopathy, EF in the  35% range. She had been hospitalized for CHF and atrial fibrillation,  primarily at [facility]. She was recently hospitalized on , for back pain. She did have a  UTI at that time and was treated with IV Levaquin. She was in atrial  fibrillation, which was controlled with Coreg and she was anticoagulated  with Eliquis. She was discharged on , by Dr. Rama Maldonado. Family had refused nursing  home and home health care. She was to take Levaquin 250 mg daily for 5  days. She had been placed on Cardizem  mg daily as well as Coreg  6.25 mg b.i.d. and Eliquis 5 mg b.i.d. She was also on spironolactone  25 mg b.i.d., and at the time of discharge, she was doing well. She developed weakness and confusion and was brought back to the ER last  night on 2021. She had altered mental status in the ER and was  also in atrial fibrillation with RVR at 120 to 130 beats per minute. There is a question whether she was receiving her home medications. She  was found to have a UTI again. During the night, her ventricular response was in the 120 to 130 range. She was given IV Cardizem and it decreased to the 80 range. She was  nauseated and did not take her medications on admission last night. When I see her this morning, she answers questions appropriately. She  denies pain. She is not oriented to time and place. She denies any chest pain or chest discomfort, although she states she  has had a \"heart attack\" many years ago. She is fairly inactive at home  and lives with her daughter, Eugenia Hay. She is unable to give me other  history. In reviewing her records, she does have a history of a CVA and  hydrocephalus with a  shunt. She does have a history of COPD. An echocardiogram had showed an EF in the 35% range done on 09/25/2020,  with an EF in the 30% to 35% range. She had mild mitral regurgitation,  with mild tricuspid regurgitation, with a PA pressure in the 40% to 45%  range. CARDIAC RISK FACTORS:  Hypertension:  Positive. Hyperlipidemia:  Positive. Peripheral Vascular Disease:  Positive (previous CVA.)  Diabetes:  Negative. Smoking:  Negative (quit years ago). Other Family Members:  Positive. MEDICATIONS PRIOR TO ADMISSION:  She was on Cardizem  mg daily,  Eliquis 5 mg b.i.d., Aldactone 25 mg b.i.d., Lasix 40 mg b.i.d., Coreg  6.25 mg b.i.d., Zocor 20 mg nightly, lisinopril 40 mg daily. PAST MEDICAL AND SURGICAL HISTORY:  1. She has a history of severe LV dysfunction, EF in the 30% to 35%  range. 2.  She has had several admissions for CHF. 3.  She has never had a cardiac catheterization that I can see  documented in her past medical history. 4.  She has chronic pain in her right knee. 5.  She does have dementia. 6.  History of hydrocephalus and has a  shunt. 7.  She had a CVA. 8.  She has a history of chronic atrial fibrillation since 09/2020.  9. She has had recurrent UTIs.   10.  Does have a history of pedal edema, which has resolved. 11. She had a Lexiscan Cardiolite stress test on 10/13/2020, although  unable to find a completed report. FAMILY HISTORY:  Mother had diabetes. SOCIAL HISTORY:  She is 77years old. She quit smoking years ago. She  has 7 children, consisting of 3 boys and 4 girls. Lives with her  daughter, [facility]. She is very inactive. REVIEW OF SYSTEMS:  Cardiac as above. Other systems reviewed including  constitutional, eyes, ears, nose and throat, cardiovascular,  respiratory, GI, , musculoskeletal, integumentary, neurologic,  endocrine, hematologic and allergic/immunologic are negative except for  what is described above, although she was a very poor historian. PHYSICAL EXAMINATION:  VITAL SIGNS:  Her blood pressure was 115/70 with a heart rate of 85 and  irregularly irregular. Respirations were 18. O2 sat was 95%. GENERAL:  She is a pleasant 59-year-old female. Awake, alert. She  answered questions appropriately although had a very poor memory and  with a very difficult history. SKIN:  No unusual skin changes. HEENT:  The pupils are equally round and intact. Mucous membranes were  dry. NECK:  No JVD. Good carotid pulses. No carotid bruits. No  lymphadenopathy or thyromegaly. CARDIOVASCULAR EXAM:  S1 and S2 were normal.  No S3 or S4. She was  irregularly irregular. She had a soft systolic murmur. No diastolic  murmur. PMI was normal.  No lift, thrust, or pericardial friction rub. LUNGS:  Quite clear to auscultation and percussion. ABDOMEN:  Soft and nontender. Good bowel sounds. The aorta was not  enlarged. No hepatomegaly, splenomegaly. EXTREMITIES:  Good femoral pulses. Good pedal pulses. She had no pedal  edema. Skin was warm and dry. No calf tenderness. Nail beds pink. Good cap refill. PULSES:  Bilateral symmetrical radial, brachial and carotid pulses. No  carotid bruits. Good femoral and pedal pulses.   NEUROLOGIC EXAM:  Within normal limits. PSYCHIATRIC EXAM:  Within normal limits. LABORATORY DATA:  Her sodium was 139, potassium 4.4, BUN 24, creatinine  0.76, GFR greater than 60. Calcium was 8.0. White count 9.9,  hemoglobin 14.4, with a platelet count of 675,013. EKG showed atrial fibrillation with RVR with nonspecific ST changes with  inferolateral ischemia. Chest x-ray was clear, with no CHF or infiltrates present. Urinalysis showed 4+ bacteria. IMPRESSION:  1. Chronic atrial fibrillation since at least 09/2020.  2.  On Eliquis 5 mg b.i.d.  3.  Dementia. 4.  Severe LV dysfunction, ejection fraction of 30% to 35% in 10/2020.  5.  Possible, which I feel is probably, ischemic cardiomyopathy. 6.  Question home compliance with medications as her heart rate responds  nicely to medications in the hospital.  7.  UTI now. 8.  History of a CVA. 9.  Hydrocephalus with a history of a  shunt. 10.  Osteoarthritis. PLAN:  1. Resume home medications. 2.  Echocardiogram, which is pending. DISCUSSION:  The patient has had admissions for CHF at [facility]. I  have very few records, but I do have a cardiology consult from 09/2020. At that time, a topic of a cardiac catheterization was broached and was  refused by her and her family. At that time, her EF was 30% to 35%. She had a Lexiscan Cardiolite stress test, although I am unable to find  the results. At this point, she is comfortable. There is no evidence of any CHF at  this time. She was given IV Cardizem last night and her heart rate has  responded nicely, being in the 80s this morning. I do not have much to offer with her care. She will resume her home  medications this morning as she was nauseated last night and could not  take her home meds. At this point, her fluid status appears to be  optimized. If I can be of any help, please call me. Thank you very much for seeing the patient.   If you have any questions  on my thoughts, please do not hesitate to contact me.         Alba Adamson    D: 06/03/2021 7:11:13       T: 06/03/2021 7:19:04     [email or web address]  Job#: [id number]     Doc#: [id number]    CC:  Leanne Chaudhari

## 2021-06-03 NOTE — PROGRESS NOTES
Met with Patient this a.m. and telephone voicemail message left on daughter, Tea's, voicemail, re: Patient discharge plans. Patient is a 77year old , white female, admitted with a diagnosis of Atrial fibrillation with RVR. Patient is alert, responding to questions being asked of her appropriately. Is noted to have dementia. States that she wishes to return home with daughter and daughter's family upon discharge. Is open to home health referral for strengthening and wishes for LSW to discuss with daughter. Freedom of choice list left in Patient room at this time. Patient resides in [facility] with her daughter Concepcion Barnard and Tea's family. She is noted to have several children but unclear on whereabouts of other family members. Patient reports that she uses a cane and a walker at home. She relies on her daughter for daily personal care needs and also for assistance with medication and transportation needs. Patient currently utilizing no outside services or resources per her own report. PCP is JAYLAN Nixon. Patient states that she has \"good insurance\" and denies needing further assistance with the cost of her medications. Discharge plan discussed with Patient and daughter Concepcion Barnard and son in law when they returned LSW call. Family feel that Patient can benefit from skilled rehab and then return home with home health. Reviewed facilities locally that accept Patient insurance and daughter chooses [facility] in [facility] for Patient. Referral made to [facility] at this time. Will await their review and assist with discharge placement as appropriate. Patient is a 'Full Code' and has no medical directives currently on file.       Joseph. Klyee Reynaga 67, [facility]  6/3/2021

## 2021-06-03 NOTE — PROGRESS NOTES
Per Dr. Rajwinder Cornell hold all PO meds tonight due to pt nausea. Pt has vomited every time she attempts to swallow. Meds that were not attempted to administer were returned to med cabinet. Will continue to monitor.

## 2021-06-03 NOTE — PROGRESS NOTES
[facility]  Occupational Therapy  Evaluation  Date: 6/3/2021  Patient Name: Malaika Collado        MRN: [id number]    : 1955  (77 y.o.)  Gender: female   Referring Practitioner: Dr. Paty Cooper     Additional Pertinent Hx: Patient is admitted through the ED due to a change in mental status as well as decreased oral intake. Patient is referred to OT services due to weakness  Past Medical History:   Diagnosis Date    Atrial fibrillation ([address]     Cerebral artery occlusion with cerebral infarction ([address] LAST ONE 1990    X 2 NO DEFECITS    Difficult intravenous access     Hydrocephalus ([address] 2016    FORGETFUL AND SLEEPING ALOT    Hyperlipidemia 1996    ON RX    Hypertension     ON RX    MRSA (methicillin resistant staph aureus) culture positive 2018    neck    Smoker     TIA (transient ischemic attack)     Wears glasses      Past Surgical History:   Procedure Laterality Date    KNEE SURGERY Right 2008    KNEE SURGERY Right     OVER 20 YRS AGO    OTHER SURGICAL HISTORY Right 2016     shunt     TUBAL LIGATION  1984    VENTRICULOPERITONEAL SHUNT Right 2016       Restrictions/Precautions: Fall Risk        Subjective  Subjective: Patient is supine in bed upon arrival  Pain Level: 0  Pain Location: Back  Pain Orientation: Lower  Orientation  Overall Orientation Status: Impaired  Orientation Level: Disoriented to place, Disoriented to situation, Disoriented to time  Vision  Vision: Within Functional Limits  Vision Exceptions: Wears glasses at all times  Hearing  Hearing: Within functional limits  Social/Functional History  Lives With: Family  ADL Assistance: Needs assistance  Additional Comments: Patient is unable to state PLOF due to dementia. Patients family is not present at this time to get further information.       Objective                     Gross LUE Strength:  (NT due to refusal)  Gross RUE Strength:  (NT due to refusal)  ADL  Grooming: Maximum assistance  UE Bathing: Maximum assistance  LE Bathing: Maximum assistance  UE Dressing: Maximum assistance  LE Dressing: Maximum assistance  Toileting: Maximum assistance   Supine to Sit: Moderate assistance  Sit to Supine: Moderate assistance, 2 Person assistance       Balance  Sitting Balance: Contact guard assistance  Standing Balance: Unable to assess(comment) (Patient refused standing)               Assessment  Upon arrival patient is supine in bed. Patient refuses to complete OT evaluation multiple times, screaming \"leave me alone. \"  After much encouragement from nurse patient is agreeable to sitting EOB. Patient sits x60 seconds while screaming \"I can't, I can't. \"  Patient is refuses to complete UB AROM or strength assessments. Patient is returned to bed with mod assist x2. Remains supine in bed with call light in reach. Goals  Short term goals  Time Frame for Short term goals: 3 days (6-5-2021)  Short term goal 1: Patient to sit EOB x 5 minutes with supervision only in order to complete grooming tasks. Short term goal 2: Patient to transfer to bedside commode with minimal assist x1  Short term goal 3: Patient to tolerate 10 minutes BUE exercise with 2 or fewer rest breaks in order to improve endurance.   Long term goals  Time Frame for Long term goals : STG=LTG    Plan       Times per day: Daily (1x per day)  Weeks: 3 days           Time In: 1515  Time Out: 1535  Timed Coded Minutes: 0  Total Treatment Time: [facility], OTR/L  6/3/2021

## 2021-06-03 NOTE — PROGRESS NOTES
[facility]  Physical Therapy  Evaluation  Date: 6/3/2021  Patient Name: Vikas Bloom        MRN: [id number]    : 1955  (77 y.o.)  Gender: female   Referring Practitioner: Dr. Eliud Larose  Diagnosis: Afib with RVR  Additional Pertinent Hx: Pt with recent D/C from [facility] on 21. Pt admit via ER on 21 due to confusion, poor oral intake for the past 4 to 5 days   Past Medical History:   Diagnosis Date    Atrial fibrillation ([address]     Cerebral artery occlusion with cerebral infarction ([address] LAST ONE 1990    X 2 NO DEFECITS    Difficult intravenous access     Hydrocephalus ([address] 2016    FORGETFUL AND SLEEPING ALOT    Hyperlipidemia 1996    ON RX    Hypertension 1996    ON RX    MRSA (methicillin resistant staph aureus) culture positive 2018    neck    Smoker     TIA (transient ischemic attack)     Wears glasses      Past Surgical History:   Procedure Laterality Date    KNEE SURGERY Right 2008    KNEE SURGERY Right     OVER 20 YRS AGO    OTHER SURGICAL HISTORY Right 2016     shunt     TUBAL LIGATION  1984    VENTRICULOPERITONEAL SHUNT Right 2016       Restrictions  Restrictions/Precautions: Fall Risk      Subjective  Pain Level: 0    Orientation  Overall Orientation Status: Impaired  Orientation Level: Oriented to person    Home Living / Prior Level of Function  Lives With: Family(lives with her dtr [facility] and her son in law and grandchildren in their home in [facility])  IADL Comments: Pt uses a walker to get around at home. Pt also has a BSC and nebulizer available at home. Pt was not receiving any community services prior to admission. Pt's family transports her to appointments when they can get her to go. Additional Comments: Home set up and PLOF unknown.   Pt unable to provide information    Objective  Strength RLE  Strength RLE: Exception  Comment: Pt not able to follow direction for MMT - stating \"I can't'\"  Strength LLE  Strength LLE: Exception  Comment:

## 2021-06-03 NOTE — H&P
History & Physical    Patient:  Pallavi Valente  YOB: 1955  Date of Service: 6/3/2021  MRN: [id number]   Acct:   [de-identified]   Primary Care Physician: JAYLAN Juarez    Chief Complaint:   Chief Complaint   Patient presents with    Fatigue     Pt was recently discharged from hospital for UTI, per pt family she hasnt been herself since she was discharged home. Pt hasnt had an appetite for past 4-5 days.  Altered Mental Status       History of Present Illness: The patient is a 77 y.o. female presented to the emergency room by EMS from home for evaluation of confusion, poor oral intake for the past 4 to 5 days. Patient is confused this morning and not able to provide with history. I reviewed her EMR and ER report. Apparently the patient did not complain of any pain, dysuria, had no nausea or vomiting or diarrhea. Family was mainly concerned due to her decreased food and fluid intake and altered mental status. The patient was admitted and discharged from our facility last month 5/9-5/12. At that time she was treated for UTI with culture showing E. coli, back pain. Patient's family declined ECF placement to continue with rehab and they also declined home health care. Work-up in the emergency room revealed temperature 97.5, heart rate was 138, respirations 20, blood pressure 1 62/74, she was 100% on room air. BMP showed sodium 138, potassium 3.4, CO2 21, BUN 28 and creatinine 0.94, magnesium was 1.9, calcium 9.4. LFTs unremarkable except elevated alk phos level 147. WBC was 12.9, H&H elevated 17.6/53.5, platelets 218. CT head revealed no acute abnormalities, ventriculostomy tube. UA revealed positive nitrates, 3+ leukocyte esterase,  WBC and 4+ bacteria. Portable chest x-ray revealed no acute cardiopulmonary process. EKG showed Afib with RVR. Patient was treated in the emergency room with IV fluid boluses, also Cardizem IV bolus, started on IV Rocephin.   Admitted for management of UTI, A. fib with RVR and altered mental status. After arrival to the floor the patient had multiple episodes of nausea and vomiting, treated with Zofran. The nurse reported that the patient could be having dysphagia. Patient has no history of it in the past.      Past Medical History:        Diagnosis Date    Atrial fibrillation ([address]     Cerebral artery occlusion with cerebral infarction ([address] LAST ONE 1990    X 2 NO DEFECITS    Difficult intravenous access     Hydrocephalus ([address] 08/2016    FORGETFUL AND SLEEPING ALOT    Hyperlipidemia 1996    ON RX    Hypertension 1996    ON RX    MRSA (methicillin resistant staph aureus) culture positive 07/02/2018    neck    Smoker     TIA (transient ischemic attack)     Wears glasses        Past Surgical History:        Procedure Laterality Date    KNEE SURGERY Right 2008    KNEE SURGERY Right     OVER 20 YRS AGO    OTHER SURGICAL HISTORY Right 12/14/2016     shunt     TUBAL LIGATION  1984    VENTRICULOPERITONEAL SHUNT Right 12/14/2016       Home Medications:   No current facility-administered medications on file prior to encounter.      Current Outpatient Medications on File Prior to Encounter   Medication Sig Dispense Refill    ibuprofen (ADVIL;MOTRIN) 200 MG tablet Take 400 mg by mouth every 6 hours as needed for Pain      acetaminophen (TYLENOL) 500 MG tablet Take 1,000 mg by mouth every 6 hours as needed for Pain      dilTIAZem (CARDIZEM CD) 120 MG extended release capsule Take 1 capsule by mouth daily 30 capsule 0    apixaban (ELIQUIS) 5 MG TABS tablet Take 5 mg by mouth 2 times daily      spironolactone (ALDACTONE) 25 MG tablet Take 25 mg by mouth 2 times daily      furosemide (LASIX) 40 MG tablet Take 40 mg by mouth 2 times daily      carvedilol (COREG) 6.25 MG tablet Take 6.25 mg by mouth 2 times daily      simvastatin (ZOCOR) 20 MG tablet Take 1 tablet by mouth nightly 30 tablet 3    lisinopril (PRINIVIL;ZESTRIL) 40 MG tablet Take 1 tablet by mouth daily 30 tablet 3       Allergies:  Sulfa antibiotics, Adhesive tape, and Penicillins    Social History:    reports that she has quit smoking. Her smoking use included cigarettes. She has a 40.00 pack-year smoking history. She has never used smokeless tobacco. She reports that she does not drink alcohol and does not use drugs. Family History:       Problem Relation Age of Onset    Diabetes Father         IDDM    High Blood Pressure Sister     Cancer Sister         UNKNOWN    Diabetes Maternal Grandmother         IDDM    High Blood Pressure Maternal Grandmother     High Blood Pressure Sister        Review of systems:    Unable to perform review of systems due to patient's confusion    Vitals:   Vitals:    06/03/21 0441   BP: 135/80   Pulse: 86   Resp: 16   Temp: 97.9 °F (36.6 °C)   SpO2: 97%      BMI: Body mass index is 22.02 kg/m².     Physical Exam:  General Appearance: Awake, cooperative, confused, in no acute distress  Cardiovascular: Irregularly irregular, normal S1 and S2, no murmurs, rubs, clicks, or gallops, distal pulses intact  Pulmonary/Chest: clear to auscultation bilaterally- no wheezes, rales or rhonchi, normal air movement, no respiratory distress  Abdomen: soft, non-tender, non-distended, normal bowel sounds  Extremities: no cyanosis, clubbing or edema   Skin: warm and dry, no rash or erythema  Head: normocephalic and atraumatic, oral mucosa moist  Eyes: pupils equal, round, and reactive to light  Neck: supple and non-tender without mass, no thyromegaly   Neurological: normal speech, no focal findings or movement disorder noted    Review of Labs and Diagnostic Testing:    Recent Results (from the past 24 hour(s))   Comprehensive Metabolic Panel w/ Reflex to MG    Collection Time: 06/02/21  4:40 PM   Result Value Ref Range    Glucose 220 (H) 70 - 99 mg/dL    BUN 28 (H) 8 - 23 mg/dL    CREATININE 0.94 (H) 0.50 - 0.90 mg/dL    Bun/Cre Ratio NOT REPORTED 9 - 20    Calcium 9.4 8.6 - 10.4 mg/dL    Sodium 137 135 - 144 mmol/L    Potassium 3.4 (L) 3.7 - 5.3 mmol/L    Chloride 94 (L) 98 - 107 mmol/L    CO2 21 20 - 31 mmol/L    Anion Gap 22 (H) 9 - 17 mmol/L    Alkaline Phosphatase 147 (H) 35 - 104 U/L    ALT 6 5 - 33 U/L    AST 15 <32 U/L    Total Bilirubin 1.06 0.30 - 1.20 mg/dL    Total Protein 7.6 6.4 - 8.3 g/dL    Albumin 3.7 3.5 - 5.2 g/dL    Albumin/Globulin Ratio NOT REPORTED 1.0 - 2.5    GFR Non- 59 (L) >60 mL/min    GFR African American >60 >60 mL/min    GFR Comment          GFR Staging NOT REPORTED    CBC Auto Differential    Collection Time: 06/02/21  4:40 PM   Result Value Ref Range    WBC 12.9 (H) 3.5 - 11.0 k/uL    RBC 6.15 (H) 4.0 - 5.2 m/uL    Hemoglobin 17.6 (H) 12.0 - 16.0 g/dL    Hematocrit 53.5 (H) 36 - 46 %    MCV 86.9 80 - 100 fL    MCH 28.6 26 - 34 pg    MCHC 32.9 31 - 37 g/dL    RDW 15.7 (H) 12.1 - 15.2 %    Platelets 364 282 - 340 k/uL    MPV NOT REPORTED 6.0 - 12.0 fL    NRBC Automated NOT REPORTED per 100 WBC    Differential Type YES     Seg Neutrophils 67 47 - 75 %    Lymphocytes 26 15 - 40 %    Monocytes 5 4 - 8 %    Eosinophils % 1 0 - 5 %    Basophils 1 0 - 2 %    Immature Granulocytes NOT REPORTED 0 %    Segs Absolute 8.60 (H) 2.5 - 7.0 k/uL    Absolute Lymph # 3.40 1.0 - 4.8 k/uL    Absolute Mono # 0.70 0.0 - 1.0 k/uL    Absolute Eos # 0.10 0.0 - 0.4 k/uL    Basophils Absolute 0.10 0.0 - 0.2 k/uL    Absolute Immature Granulocyte NOT REPORTED 0.00 - 0.30 k/uL    WBC Morphology NOT REPORTED     RBC Morphology NOT REPORTED     Platelet Estimate NOT REPORTED    Lipase    Collection Time: 06/02/21  4:40 PM   Result Value Ref Range    Lipase 38 13 - 60 U/L   Troponin    Collection Time: 06/02/21  4:40 PM   Result Value Ref Range    Troponin, High Sensitivity 32 (H) 0 - 14 ng/L    Troponin T NOT REPORTED <0.03 ng/mL    Troponin Interp NOT REPORTED    Ethanol    Collection Time: 06/02/21  4:40 PM   Result Value Ref Range    Ethanol <10 <10 mg/dL Ethanol percent <0.010 %   Magnesium    Collection Time: 06/02/21  4:40 PM   Result Value Ref Range    Magnesium 1.9 1.6 - 2.6 mg/dL   EKG 12 Lead    Collection Time: 06/02/21  4:41 PM   Result Value Ref Range    Ventricular Rate 138 BPM    Atrial Rate 125 BPM    QRS Duration 96 ms    Q-T Interval 316 ms    QTc Calculation (Bazett) 478 ms    R Axis 29 degrees    T Axis -153 degrees   Urinalysis, reflex to microscopic    Collection Time: 06/02/21  5:23 PM   Result Value Ref Range    Color, UA YELLOW YELLOW    Turbidity UA CLOUDY (A) CLEAR    Glucose, Ur NEGATIVE NEGATIVE    Bilirubin Urine NEGATIVE NEGATIVE    Ketones, Urine TRACE (A) NEGATIVE    Specific Gravity, UA 1.020 1.005 - 1.030    Urine Hgb 1+ (A) NEGATIVE    pH, UA 5.0 5.0 - 8.0    Protein, UA 2+ (A) NEGATIVE    Urobilinogen, Urine Normal Normal    Nitrite, Urine POSITIVE (A) NEGATIVE    Leukocyte Esterase, Urine 3+ (A) NEGATIVE    Urinalysis Comments         Drug screen multi urine    Collection Time: 06/02/21  5:23 PM   Result Value Ref Range    Amphetamine Screen, Ur NEGATIVE NEGATIVE    Barbiturate Screen, Ur NEGATIVE NEGATIVE    Benzodiazepine Screen, Urine NEGATIVE NEGATIVE    Cocaine Metabolite, Urine NEGATIVE NEGATIVE    Methadone Screen, Urine NEGATIVE NEGATIVE    Opiates, Urine NEGATIVE NEGATIVE    Phencyclidine, Urine NEGATIVE NEGATIVE    Propoxyphene, Urine NEGATIVE NEGATIVE    Cannabinoid Scrn, Ur NEGATIVE NEGATIVE    Oxycodone Screen, Ur NEGATIVE NEGATIVE    Methamphetamine, Urine NEGATIVE NEGATIVE    Tricyclic Antidepressants, Urine NEGATIVE NEGATIVE    MDMA, Urine NOT REPORTED NEGATIVE    Buprenorphine Urine NOT REPORTED NEGATIVE    Test Information NOT REPORTED    COVID-19, Rapid    Collection Time: 06/02/21  5:23 PM    Specimen: Nasopharyngeal Swab   Result Value Ref Range    Specimen Description . NASOPHARYNGEAL SWAB     SARS-CoV-2, Rapid Not Detected Not Detected   Microscopic Urinalysis    Collection Time: 06/02/21  5:23 PM   Result Value Ref Range    -          WBC, UA 50  0 /HPF    RBC, UA 5 TO 10 0 - 2 /HPF    Casts UA NOT REPORTED /LPF    Crystals, UA NOT REPORTED None /HPF    Epithelial Cells UA 2 TO 5 /HPF    Renal Epithelial, UA NOT REPORTED 0 /HPF    Bacteria, UA 4+ (A) None    Mucus, UA NOT REPORTED None    Trichomonas, UA NOT REPORTED None    Amorphous, UA NOT REPORTED None    Other Observations UA NOT REPORTED NOT REQ.     Yeast, UA NOT REPORTED None   Troponin    Collection Time: 06/02/21  5:45 PM   Result Value Ref Range    Troponin, High Sensitivity 22 (H) 0 - 14 ng/L    Troponin T NOT REPORTED <0.03 ng/mL    Troponin Interp NOT REPORTED    Lactic Acid, Plasma    Collection Time: 06/02/21  8:55 PM   Result Value Ref Range    Lactic Acid 1.9 0.5 - 2.2 mmol/L    Lactic Acid, Whole Blood NOT REPORTED 0.7 - 2.1 mmol/L   Troponin    Collection Time: 06/02/21  8:55 PM   Result Value Ref Range    Troponin, High Sensitivity 20 (H) 0 - 14 ng/L    Troponin T NOT REPORTED <0.03 ng/mL    Troponin Interp NOT REPORTED    Lactic Acid, Plasma    Collection Time: 06/03/21 12:04 AM   Result Value Ref Range    Lactic Acid 1.3 0.5 - 2.2 mmol/L    Lactic Acid, Whole Blood NOT REPORTED 0.7 - 2.1 mmol/L   Lactic Acid, Plasma    Collection Time: 06/03/21  2:30 AM   Result Value Ref Range    Lactic Acid 1.3 0.5 - 2.2 mmol/L    Lactic Acid, Whole Blood NOT REPORTED 0.7 - 2.1 mmol/L   Troponin    Collection Time: 06/03/21  2:30 AM   Result Value Ref Range    Troponin, High Sensitivity 23 (H) 0 - 14 ng/L    Troponin T NOT REPORTED <0.03 ng/mL    Troponin Interp NOT REPORTED    Basic Metabolic Panel w/ Reflex to MG    Collection Time: 06/03/21  5:53 AM   Result Value Ref Range    Glucose 100 (H) 70 - 99 mg/dL    BUN 24 (H) 8 - 23 mg/dL    CREATININE 0.76 0.50 - 0.90 mg/dL    Bun/Cre Ratio 32 (H) 9 - 20    Calcium 8.0 (L) 8.6 - 10.4 mg/dL    Sodium 139 135 - 144 mmol/L    Potassium 4.4 3.7 - 5.3 mmol/L    Chloride 105 98 - 107 mmol/L    CO2 25 20 - 31 mmol/L    Anion Gap 9 9 - 17 mmol/L    GFR Non-African American >60 >60 mL/min    GFR African American >60 >60 mL/min    GFR Comment          GFR Staging NOT REPORTED    CBC auto differential    Collection Time: 06/03/21  5:53 AM   Result Value Ref Range    WBC 9.9 3.5 - 11.0 k/uL    RBC 4.98 4.0 - 5.2 m/uL    Hemoglobin 14.4 12.0 - 16.0 g/dL    Hematocrit 43.2 36 - 46 %    MCV 86.9 80 - 100 fL    MCH 28.8 26 - 34 pg    MCHC 33.2 31 - 37 g/dL    RDW 15.8 (H) 12.1 - 15.2 %    Platelets 272 266 - 767 k/uL    MPV NOT REPORTED 6.0 - 12.0 fL    NRBC Automated NOT REPORTED per 100 WBC    Differential Type YES     Immature Granulocytes NOT REPORTED 0 %    Absolute Immature Granulocyte NOT REPORTED 0.00 - 0.30 k/uL    WBC Morphology NOT REPORTED     RBC Morphology NOT REPORTED     Platelet Estimate NOT REPORTED     Seg Neutrophils 65 47 - 75 %    Lymphocytes 23 15 - 40 %    Monocytes 8 4 - 8 %    Eosinophils % 1 0 - 5 %    Basophils 3 (H) 0 - 2 %    Segs Absolute 6.50 2.5 - 7.0 k/uL    Absolute Lymph # 2.30 1.0 - 4.8 k/uL    Absolute Mono # 0.80 0.0 - 1.0 k/uL    Absolute Eos # 0.10 0.0 - 0.4 k/uL    Basophils Absolute 0.30 (H) 0.0 - 0.2 k/uL   Troponin    Collection Time: 06/03/21  5:53 AM   Result Value Ref Range    Troponin, High Sensitivity 21 (H) 0 - 14 ng/L    Troponin T NOT REPORTED <0.03 ng/mL    Troponin Interp NOT REPORTED        Radiology:     CT Head WO Contrast    Result Date: 6/2/2021  CLINICAL HISTORY: Altered mental status. EXAMINATION: Unenhanced CT scan of the brain: 6/2/2021. COMPARISON: Unenhanced CT scan of the brain: 5/8/2021. TECHNIQUE: Noncontrast CT of the brain performed in the axial plane from skull base to vertex at 5 mm intervals. Dose reduction techniques were achieved by using automated exposure control and/or adjustment of mA and/or kV according to patient size and/or use of iterative reconstruction technique. FINDINGS: There is no interval change in ventriculostomy tube.  Parietal approach with the tip at the left lateral ventricle. The combined diameter of the frontal horns at the level of foramen of Monro from right to left is 4.7 cm, in a similar prior location, previously this measured approximately 4.5 cm. There are chronic microvascular ischemic changes surrounding the ventricles. No definite acute infarct, hemorrhage or mass is identified. There is no midline shift. There are some calcifications in the basal ganglia. The visualized intraorbital contents, paranasal sinuses, mastoid air cells appear normal except for mild opacification of left ethmoid air cells. The calvarium appears intact. 1. Despite the ventriculostomy tube, no significant interval change in prominent ventricles. 2. No definite acute infarct, hemorrhage or acute intracranial process otherwise. XR CHEST PORTABLE    Result Date: 6/2/2021  EXAM: XR CHEST PORTABLE HISTORY: Reason for exam:->AMS 57-year-old female. COMPARISON: Portable chest 10/16/2020. TECHNIQUE: AP portable chest 1636 hours. FINDINGS: Rotation to the right. Heart size upper normal. Lungs clear. Ventricular peritoneal shunt tubing over the right chest.      shunt tubing over the right chest. No acute change. Assessment/ Plan:    1. UTI - patient is on IV Levaquin, urine culture pending. 2.  Atrial fibrillation with RVR -patient with history of atrial fibrillation in the past, anticoagulated with Eliquis. Heart rate improved after IV Cardizem boluses. Continue on oral Cardizem, Coreg. Will consult Dr. Malvin Stein for additional recommendations. Ordered 2D echo. She has  elevated troponin levels  3. Altered mental status most likely secondary to UTI -improved  4. Hypokalemia - replaced  5. Nausea and vomiting - likely due to infectious process,  on IV fluids  6. Suspected dysphagia - consult speech therapist for evaluation  7. History of NPH, s/p right  shunt 2016  8.   Hypertension -blood pressure stable, Coreg, lisinopril     CODE STATUS full      Advanced Care Plan    (x )  I confirmed that the patient's Advanced Care Plan is present, code status documented, or surrogate decision maker is listed in the patient's medical record. ( )  The patient's advanced care plan is not present because:  (select)   ( ) I confirmed today that the patient does not wish or was not able to name a surrogate decision maker or provide an [address] ( ) Hospice care is currently being provided or has been provided this calender year. ( )  I did not confirm today the presence of an [address] or surrogate decision maker documented within the patient's medical record. (Does not satisfy MIPS performance). Documentation of Current Medications in the Medical Record    (x )  I have utilized all available immediate resources to obtain, update, or review the patient's current medications. If Yes, Stop Here  ( ) The patient is not eligible for medications reconciliation; the patient is in an emergent medical situation where delaying treatment would jeopardize the patient's health. ( ) I did not confirm, update or review the patient's current list of medications today. (does not satisfy MIPS performance)        Medical Necessity: Inpatient admission is appropriate for this patient secondary to the need of IV antibiotics, IV fluids, monitoring clinical condition, labs    Estimated length of stay:  2 days. The beneficiary may reasonably be expected to be discharged or transferred to a hospital within 96 hours after admission.     DVT prophylaxis:   [] Lovenox   [] SCDs   [] SQ Heparin   [] Encourage ambulation, low risk for DVT, no chemical or mechanical    prophylaxis necessary      [x] Already on Anticoagulation    Anticipated Disposition upon discharge:   [x] Home   [] Home with Home Health   [] [facility]   [] [facility] [address]      Electronically signed by Merlyn Raza MD on 6/3/2021 at 7:02 AM

## 2021-06-03 NOTE — PROGRESS NOTES
Cardiology    1. Chronic atrial fibrillation, initially RVR, although currently doing well. 2.  Hx of severe cardiomyopathy, most likely ischemic with EF 30-35%  3. Possible hx of MI, but no details. No known previous cardiac caths. 4.  Question home compliance with medication, as HR responds nicely to meds in hospital  5. Anticoagulated with Eliquis  6. UTI    She presented with confusion and lethargy to ER. Was in AF with RVR and found to have UTI. She responded nicely to IV cardizem. This morning appears to be able to take medication, and will be placed back on po cardizem. Echo pending. No evidence of CHF. Not much to add at this point.     Raji Smith MD

## 2021-06-04 PROBLEM — E43 SEVERE MALNUTRITION (HCC): Status: ACTIVE | Noted: 2021-06-04

## 2021-06-04 LAB
ANION GAP SERPL CALCULATED.3IONS-SCNC: 8 MMOL/L (ref 9–17)
BUN BLDV-MCNC: 25 MG/DL (ref 8–23)
BUN/CREAT BLD: 33 (ref 9–20)
CALCIUM SERPL-MCNC: 8.3 MG/DL (ref 8.6–10.4)
CHLORIDE BLD-SCNC: 107 MMOL/L (ref 98–107)
CO2: 23 MMOL/L (ref 20–31)
CREAT SERPL-MCNC: 0.75 MG/DL (ref 0.5–0.9)
GFR AFRICAN AMERICAN: >60 ML/MIN
GFR NON-AFRICAN AMERICAN: >60 ML/MIN
GFR SERPL CREATININE-BSD FRML MDRD: ABNORMAL ML/MIN/{1.73_M2}
GFR SERPL CREATININE-BSD FRML MDRD: ABNORMAL ML/MIN/{1.73_M2}
GLUCOSE BLD-MCNC: 116 MG/DL (ref 70–99)
POTASSIUM SERPL-SCNC: 3.7 MMOL/L (ref 3.7–5.3)
SODIUM BLD-SCNC: 138 MMOL/L (ref 135–144)

## 2021-06-04 PROCEDURE — 6370000000 HC RX 637 (ALT 250 FOR IP): Performed by: INTERNAL MEDICINE

## 2021-06-04 PROCEDURE — 2580000003 HC RX 258: Performed by: INTERNAL MEDICINE

## 2021-06-04 PROCEDURE — 36415 COLL VENOUS BLD VENIPUNCTURE: CPT

## 2021-06-04 PROCEDURE — 6360000002 HC RX W HCPCS: Performed by: INTERNAL MEDICINE

## 2021-06-04 PROCEDURE — 97530 THERAPEUTIC ACTIVITIES: CPT

## 2021-06-04 PROCEDURE — 94761 N-INVAS EAR/PLS OXIMETRY MLT: CPT

## 2021-06-04 PROCEDURE — 1200000000 HC SEMI PRIVATE

## 2021-06-04 PROCEDURE — 80048 BASIC METABOLIC PNL TOTAL CA: CPT

## 2021-06-04 RX ORDER — LEVOFLOXACIN 500 MG/1
250 TABLET, FILM COATED ORAL DAILY
Status: DISCONTINUED | OUTPATIENT
Start: 2021-06-04 | End: 2021-06-05 | Stop reason: HOSPADM

## 2021-06-04 RX ADMIN — SODIUM CHLORIDE, PRESERVATIVE FREE 10 ML: 5 INJECTION INTRAVENOUS at 20:28

## 2021-06-04 RX ADMIN — DILTIAZEM HYDROCHLORIDE 120 MG: 120 CAPSULE, COATED, EXTENDED RELEASE ORAL at 09:01

## 2021-06-04 RX ADMIN — SODIUM CHLORIDE, PRESERVATIVE FREE 10 ML: 5 INJECTION INTRAVENOUS at 09:00

## 2021-06-04 RX ADMIN — CARVEDILOL 6.25 MG: 12.5 TABLET, FILM COATED ORAL at 09:01

## 2021-06-04 RX ADMIN — APIXABAN 5 MG: 5 TABLET, FILM COATED ORAL at 09:01

## 2021-06-04 RX ADMIN — SPIRONOLACTONE 25 MG: 25 TABLET, FILM COATED ORAL at 09:00

## 2021-06-04 RX ADMIN — LISINOPRIL 40 MG: 20 TABLET ORAL at 09:00

## 2021-06-04 RX ADMIN — APIXABAN 5 MG: 5 TABLET, FILM COATED ORAL at 20:30

## 2021-06-04 RX ADMIN — ATORVASTATIN CALCIUM 10 MG: 20 TABLET, FILM COATED ORAL at 09:00

## 2021-06-04 RX ADMIN — LEVOFLOXACIN 250 MG: 500 TABLET, FILM COATED ORAL at 18:46

## 2021-06-04 RX ADMIN — SPIRONOLACTONE 25 MG: 25 TABLET, FILM COATED ORAL at 20:29

## 2021-06-04 RX ADMIN — CARVEDILOL 6.25 MG: 12.5 TABLET, FILM COATED ORAL at 20:29

## 2021-06-04 RX ADMIN — DIPHENHYDRAMINE HYDROCHLORIDE 25 MG: 50 INJECTION, SOLUTION INTRAMUSCULAR; INTRAVENOUS at 20:41

## 2021-06-04 ASSESSMENT — PAIN SCALES - PAIN ASSESSMENT IN ADVANCED DEMENTIA (PAINAD)
BODYLANGUAGE: 0
FACIALEXPRESSION: 0
CONSOLABILITY: 0
TOTALSCORE: 0
NEGVOCALIZATION: 0
BODYLANGUAGE: 0
TOTALSCORE: 0
FACIALEXPRESSION: 0
TOTALSCORE: 0
CONSOLABILITY: 0
NEGVOCALIZATION: 0
BREATHING: 0
TOTALSCORE: 0
CONSOLABILITY: 0
CONSOLABILITY: 0
BREATHING: 0
BREATHING: 0
CONSOLABILITY: 0
BODYLANGUAGE: 0
NEGVOCALIZATION: 0
FACIALEXPRESSION: 0
FACIALEXPRESSION: 0
BREATHING: 0
CONSOLABILITY: 0
CONSOLABILITY: 0
FACIALEXPRESSION: 0
CONSOLABILITY: 0
BODYLANGUAGE: 0
FACIALEXPRESSION: 0
NEGVOCALIZATION: 0
TOTALSCORE: 0
FACIALEXPRESSION: 0
BREATHING: 0
TOTALSCORE: 0
BREATHING: 0
BODYLANGUAGE: 0
TOTALSCORE: 0
NEGVOCALIZATION: 0
BREATHING: 0
NEGVOCALIZATION: 0
BREATHING: 0
TOTALSCORE: 0
FACIALEXPRESSION: 0
BODYLANGUAGE: 0

## 2021-06-04 ASSESSMENT — PAIN SCALES - GENERAL
PAINLEVEL_OUTOF10: 0

## 2021-06-04 NOTE — PROGRESS NOTES
Comprehensive Nutrition Assessment    Type and Reason for Visit:  Initial (MST=2, Wt loss, poor appetite)    Nutrition Recommendations/Plan:   1. Continue dental soft diet, thin liquids  2. Protein with each meal  3. Suggest 4 oz  Ensure Enlive (strawberry) with each meal    Nutrition Assessment:  Severe malnutrition related to inadequate protein-energy intake as evidenced by intake 26-50%;weight loss greater than or equal to 5% in 1 month;moderate loss of subcutaneous fat;moderate muscle loss;nausea;vomiting. Pt is being seen for unintentional weight loss and poor appetite/intakes. Not patient is confused, although is able to answer some questions appropriately. Pt reports a good appetite. However, note had multiple episodes of nasuea/vomiting after arrival, patient does not recall. Pt is able to report typically consuming 2 meals per day. Also note prior to admission, pt had a poor appetite intakes for 4-5 days and new onset of potential dysphagia. Pt states she has no trouble chewing/swallowing and spoke with nursing, Cortez Keys. Pt is able to feed self, and does need softer foods. Pt reports liking applesauce, cottage cheese, does not like yogurt. Pt reports occasional diarrhea at home, has not had since been admitted. SLP completed a swallow evaluation (6/3) with pt - recommending a dental soft, thin liquid consistency, and meds in puree. Intakes are 1-25% to %. Pt recently discharged for UTI, and note had E.coli. Weight loss of 23# (actual weights) over one month noted. This is 16% weight loss x one month, which is considered significant. Labs reviewed. Acutely elevated glucose, trending back down. Pt meets criteria for severe malnutrition.      Malnutrition Assessment:  Malnutrition Status:  Severe malnutrition    Context:  Acute Illness     Findings of the 6 clinical characteristics of malnutrition:  Energy Intake:  Mild decrease in energy intake (Comment)  Weight Loss:  7 - Greater than 5% over 1 month Body Fat Loss:  7 - Moderate body fat loss Orbital, Triceps   Muscle Mass Loss:  7 - Moderate muscle mass loss Temples (temporalis), Hand (interosseous)  Fluid Accumulation:  No significant fluid accumulation Extremities   Strength:  Not Performed    Estimated Daily Nutrient Needs:  Energy (kcal):  3426-1668; Weight Used for Energy Requirements:  Admission     Protein (g):  65-75 (1.3-1.5); Weight Used for Protein Requirements:  Ideal        Fluid (ml/day):  1800+; Method Used for Fluid Requirements:  1 ml/kcal      Nutrition Related Findings:  Undernourished, loss of subcutaneous and muscle, scattered bruising BUE, BLE      Wounds:  None       Current Nutrition Therapies:    ADULT DIET; Easy to Chew    Anthropometric Measures:  · Height: 5' 2\" (157.5 cm)  · Current Body Weight: 120 lb 5.9 oz (54.6 kg)   · Admission Body Weight: 145 lb (65.8 kg)    · Usual Body Weight: 145 lb (65.8 kg)     · Ideal Body Weight: 110 lbs; % Ideal Body Weight 109.4 %   · BMI: 22  · BMI Categories: Normal Weight (BMI 18.5-24. 9)       Recent Labs     06/02/21  1640 06/03/21  0553 06/04/21  0435    139 138   K 3.4* 4.4 3.7   CL 94* 105 107   CO2 21 25 23   BUN 28* 24* 25*   CREATININE 0.94* 0.76 0.75   GLUCOSE 220* 100* 116*   ALT 6  --   --    ALKPHOS 147*  --   --    GFR       NOT REPORTED       NOT REPORTED       NOT REPORTED      Lab Results   Component Value Date    LABALBU 3.7 06/02/2021      Nutrition Diagnosis:   · Severe malnutrition related to inadequate protein-energy intake as evidenced by intake 26-50%, weight loss greater than or equal to 5% in 1 month, moderate loss of subcutaneous fat, moderate muscle loss, nausea, vomiting    Nutrition Interventions:   Food and/or Nutrient Delivery:  Continue Current Diet, Start Oral Nutrition Supplement  Nutrition Education/Counseling:  Education not appropriate   Coordination of Nutrition Care:  Continue to monitor while inpatient, Feeding Assistance/Environment Change Goals:   (PO intake >75% of meals with no signs of aspiration)       Nutrition Monitoring and Evaluation:   Behavioral-Environmental Outcomes:  Knowledge or Skill   Food/Nutrient Intake Outcomes:  Food and Nutrient Intake, Supplement Intake  Physical Signs/Symptoms Outcomes:  Biochemical Data, Chewing or Swallowing, Diarrhea, Nausea or Vomiting, Nutrition Focused Physical Findings, Weight     Discharge Planning:    Continue Oral Nutrition Supplement, Continue current diet     Electronically signed by Kary Yoder RD, LD on 6/4/21 at 8:10 AM EDT    Contact: [phone]

## 2021-06-04 NOTE — PROGRESS NOTES
Phone: Lois  Date: 2021  Fax: [phone]      Physical Therapy    Daily Note    Patient Name: Satinder Calzada      : 1955  (77 y.o.)  MRN: [id number]     Pt shows NO CHANGE toward goals and increased independence of mobility this treatment session        Assessment        Supine to Sit: Moderate assistance  Sit to Supine: Moderate assistance (x2)  Scooting: Moderate assistance                                      Assessment: Patient in bed upon arrival to room. She eventually agrees to sit at edge of bed. She requires much encouragement and mod assist.  She has poor posture upon sitting and complains of back pain. Requests to lie back down almost immediatly upon sitting. She is able to sit ~3 minutes and is then returned to supine position with mod x 2. Call light in reach and bed alarm turned on. Safety Devices  Type of devices: Bed alarm in place, Call light within reach, Left in bed, Nurse notified          Time In: 1024  Time Out: 1034  Timed Coded Minutes: 10  Total Treatment Time: 10    Exercises:      Plan  Cont Per Plan Of Care    Goals  Short Term Goals  Time Frame for Short term goals: 3 days (POC exp 21)  Short term goal 1: Pt to participate with PT daily to improve transfers and strength. Short term goal 2: Pt to complete sit to stand ModA +1 to ww. Short term goal 3: Pt to transfer to bedside chair with ww ModA +2 to improve pt mobility bryan.           Long Term Goals                      [facility] License Number: [id number]    Date: 2021

## 2021-06-04 NOTE — PROGRESS NOTES
ALMAS spoke with pt's dtr Tea this afternoon and updated that we were still waiting on insurance approval for pt to go to [facility]. Concepcion Barnard reports that her daughter is graduating this weekend and her sister Vrenon Stewart will be fielding calls regarding her mom. Her number is listed on face sheet as well. SW called to [facility] and left a message for admissions coordinator regarding progress on authorization and if she needed further notes or information. HENS and progress notes faxed to [facility]. SW will await response from [facility].  Maggi JACKSON 6/4/2021

## 2021-06-04 NOTE — PROGRESS NOTES
Hospitalist Progress Note  6/4/2021 7:29 AM  Subjective:   Admit Date: 6/2/2021  PCP: Levi Drew, PA    Interval History:     Shaye Card states she is feeling better. Has no complaints this morning. Denies CP, SOB, nausea. She is oriented to place but not time. Diet: ADULT DIET; Easy to Chew  Medications:   Scheduled Meds:   apixaban  5 mg Oral BID    carvedilol  6.25 mg Oral BID    dilTIAZem  120 mg Oral Daily    lisinopril  40 mg Oral Daily    atorvastatin  10 mg Oral Daily    spironolactone  25 mg Oral BID    sodium chloride flush  5-40 mL Intravenous 2 times per day    levofloxacin  250 mg Intravenous Q24H     Continuous Infusions:   sodium chloride       PRN Medications: diphenhydrAMINE, ibuprofen, sodium chloride flush, sodium chloride, polyethylene glycol, acetaminophen **OR** acetaminophen, ondansetron **OR** ondansetron    Objective:   Vitals: /60   Pulse 65   Temp 98 °F (36.7 °C) (Oral)   Resp 18   Ht 5' 2\" (1.575 m)   Wt 120 lb 6.4 oz (54.6 kg)   LMP 12/13/1990 (Approximate)   SpO2 96%   BMI 22.02 kg/m²   BMI: Body mass index is 22.02 kg/m².     CBC:   Recent Labs     06/02/21  1640 06/03/21  0553   WBC 12.9* 9.9   HGB 17.6* 14.4    181     BMP:    Recent Labs     06/02/21  1640 06/03/21  0553 06/04/21  0435    139 138   K 3.4* 4.4 3.7   CL 94* 105 107   CO2 21 25 23   BUN 28* 24* 25*   CREATININE 0.94* 0.76 0.75   GLUCOSE 220* 100* 116*     Hepatic:   Recent Labs     06/02/21  1640   AST 15   ALT 6   BILITOT 1.06   ALKPHOS 147*       Physical Exam:    General Appearance: Awake, cooperative, confused, in no acute distress  Cardiovascular: Irregularly irregular, normal S1 and S2, no murmurs, rubs, clicks, or gallops, distal pulses intact  Pulmonary/Chest: clear to auscultation bilaterally- no wheezes  Abdomen: soft, non-tender, non-distended, normal bowel sounds  Extremities: no cyanosis, clubbing or edema   Skin: warm and dry, no rash or erythema  Neurological: normal speech, no focal findings or movement disorder noted        Assessment and Plan:       1. UTI - patient is on IV Levaquin, urine culture preliminary showing gram-negative rods more than 100,000  2. Atrial fibrillation with RVR, history of chronic A. Fib - heart rate stabilized, Cardizem and Coreg,  anticoagulated with Eliquis. She was evaluated by Dr. Malvin Stein. 2D echo pending  3. Altered mental status most likely secondary to UTI - improved  4. Generalized weakness -evaluated by PT and OT, recommended rehab. Family would like the patient to go to [facility] for therapy when medically stable  5. Nausea and vomiting - likely due to infectious process, resolved  6. Suspected dysphagia - evaluated by speech therapist, no significant dysphagia  7. History of NPH, s/p right  shunt 2016  8. Hypertension -blood pressure stable, Coreg, lisinopril   9. History of severe LV dysfunction with EF 30 to 35% in 10/20.   Echo pending    Patient continues to require inpatient admission related to need for IV antibiotics      Electronically signed by Yennifer Perry MD on 6/4/2021 at 7:29 AM    Rounding Hospitalist

## 2021-06-04 NOTE — PLAN OF CARE
Problem: Falls - Risk of:  Goal: Will remain free from falls  Description: Will remain free from falls  6/4/2021 1557 by Yaw Curtis RN  Outcome: Met This Shift  6/4/2021 1242 by Yaw Curtis RN  Outcome: Met This Shift  Goal: Absence of physical injury  Description: Absence of physical injury  Outcome: Met This Shift     Problem: Skin Integrity:  Goal: Will show no infection signs and symptoms  Description: Will show no infection signs and symptoms  Outcome: Met This Shift  Goal: Absence of new skin breakdown  Description: Absence of new skin breakdown  Outcome: Met This Shift     Problem: SAFETY  Goal: Free from accidental physical injury  6/4/2021 1557 by Yaw Curtis RN  Outcome: Met This Shift  6/4/2021 1242 by Yaw Curtis RN  Outcome: Met This Shift  Goal: Free from intentional harm  Outcome: Met This Shift     Problem: DAILY CARE  Goal: Daily care needs are met  Outcome: Met This Shift     Problem: PAIN  Goal: Patient's pain/discomfort is manageable  Outcome: Met This Shift     Problem: SKIN INTEGRITY  Goal: Skin integrity is maintained or improved  Outcome: Met This Shift     Problem: Mental Status - Impaired:  Goal: Mental status will improve  Description: Mental status will improve  6/4/2021 1557 by Yaw Curtis RN  Outcome: Met This Shift  Note: Pt appears to be at baseline  6/4/2021 1242 by Yaw Curtis RN  Outcome: Ongoing     Problem: Urinary Elimination:  Goal: Signs and symptoms of infection will decrease  Description: Signs and symptoms of infection will decrease  Outcome: Ongoing     Problem: KNOWLEDGE DEFICIT  Goal: Patient/S.O. demonstrates understanding of disease process, treatment plan, medications, and discharge instructions.   6/4/2021 1557 by Yaw Curtis RN  Outcome: Not Met This Shift  6/4/2021 1242 by Yaw Curtis RN  Outcome: Not Met This Shift     Problem: Nutrition  Goal: Optimal nutrition therapy  6/4/2021 0928 by Yesika Medina, ZELALEM, LD  Note: Nutrition Problem #1: Severe

## 2021-06-04 NOTE — PLAN OF CARE
Problem: Falls - Risk of:  Goal: Will remain free from falls  Description: Will remain free from falls  6/4/2021 1242 by Aruna Taylor RN  Outcome: Met This Shift  6/3/2021 2317 by Silvia Ontiveros RN  Outcome: Ongoing     Problem: SAFETY  Goal: Free from accidental physical injury  6/4/2021 1242 by Aruna Taylor RN  Outcome: Met This Shift  6/3/2021 2317 by Silvia Ontiveros RN  Outcome: Ongoing     Problem: Falls - Risk of:  Goal: Absence of physical injury  Description: Absence of physical injury  6/3/2021 2317 by Silvia Ontiveros RN  Outcome: Ongoing     Problem: Skin Integrity:  Goal: Will show no infection signs and symptoms  Description: Will show no infection signs and symptoms  6/3/2021 2317 by Silvia Ontiveros RN  Outcome: Ongoing  Goal: Absence of new skin breakdown  Description: Absence of new skin breakdown  6/3/2021 2317 by Silvia Ontiveros RN  Outcome: Ongoing     Problem: SAFETY  Goal: Free from intentional harm  6/3/2021 2317 by Silvia Ontiveros RN  Outcome: Ongoing     Problem: DAILY CARE  Goal: Daily care needs are met  6/3/2021 2317 by Silvia Ontiveros RN  Outcome: Ongoing     Problem: PAIN  Goal: Patient's pain/discomfort is manageable  6/3/2021 2317 by Silvia Ontiveros RN  Outcome: Ongoing     Problem: SKIN INTEGRITY  Goal: Skin integrity is maintained or improved  6/3/2021 2317 by Slivia Ontiveros RN  Outcome: Ongoing     Problem: DISCHARGE BARRIERS  Goal: Patient's continuum of care needs are met  6/3/2021 2317 by Silvia Ontiveros RN  Outcome: Ongoing     Problem: Sensory:  Goal: General experience of comfort will improve  Description: General experience of comfort will improve  6/3/2021 2317 by Silvia Ontiveros RN  Outcome: Ongoing     Problem: Urinary Elimination:  Goal: Signs and symptoms of infection will decrease  Description: Signs and symptoms of infection will decrease  6/3/2021 2317 by Silvia Ontiveros RN  Outcome: Ongoing  Goal: Ability to reestablish a normal urinary elimination pattern will improve - after catheter removal  Description: Ability to reestablish a normal urinary elimination pattern will improve  6/3/2021 2317 by Abel Humphreys RN  Outcome: Ongoing  Goal: Complications related to the disease process, condition or treatment will be avoided or minimized  Description: Complications related to the disease process, condition or treatment will be avoided or minimized  6/3/2021 2317 by Abel Humphreys RN  Outcome: Ongoing     Problem: Mental Status - Impaired:  Goal: Mental status will improve  Description: Mental status will improve  6/4/2021 1242 by Ricardo Conley RN  Outcome: Ongoing  6/3/2021 2317 by Abel Humphreys RN  Outcome: Ongoing     Problem: Pain:  Description: Pain management should include both nonpharmacologic and pharmacologic interventions. Goal: Pain level will decrease  Description: Pain level will decrease  6/3/2021 2317 by Abel Humphreys RN  Outcome: Ongoing  Goal: Control of acute pain  Description: Control of acute pain  6/3/2021 2317 by Abel Humphreys RN  Outcome: Ongoing  Goal: Control of chronic pain  Description: Control of chronic pain  6/3/2021 2317 by Abel Humphreys RN  Outcome: Ongoing     Problem: KNOWLEDGE DEFICIT  Goal: Patient/S.O. demonstrates understanding of disease process, treatment plan, medications, and discharge instructions.   6/4/2021 1242 by Ricardo Conley RN  Outcome: Not Met This Shift  6/3/2021 2317 by Abel Humphreys RN  Outcome: Ongoing     Problem: Nutrition  Goal: Optimal nutrition therapy  6/4/2021 0928 by Oliver Rubio RD, DIERDRE  Note: Nutrition Problem #1: Severe malnutrition  Intervention: Food and/or Nutrient Delivery: Continue Current Diet, Start Oral Nutrition Supplement  Nutritional Goals:  (PO intake >75% of meals with no signs of aspiration)  Electronically signed by Oliver Rubio RD, DEIRDRE CRAWFORDN, ZELALEMN, LD on 6/4/2021 at 9:29 AM    Contact Number:  [phone]

## 2021-06-04 NOTE — PLAN OF CARE
Problem: Falls - Risk of:  Goal: Will remain free from falls  Description: Will remain free from falls  Outcome: Ongoing  Goal: Absence of physical injury  Description: Absence of physical injury  Outcome: Ongoing     Problem: Skin Integrity:  Goal: Will show no infection signs and symptoms  Description: Will show no infection signs and symptoms  Outcome: Ongoing  Goal: Absence of new skin breakdown  Description: Absence of new skin breakdown  Outcome: Ongoing     Problem: SAFETY  Goal: Free from accidental physical injury  Outcome: Ongoing  Goal: Free from intentional harm  Outcome: Ongoing     Problem: DAILY CARE  Goal: Daily care needs are met  Outcome: Ongoing     Problem: PAIN  Goal: Patient's pain/discomfort is manageable  Outcome: Ongoing     Problem: SKIN INTEGRITY  Goal: Skin integrity is maintained or improved  Outcome: Ongoing     Problem: KNOWLEDGE DEFICIT  Goal: Patient/S.O. demonstrates understanding of disease process, treatment plan, medications, and discharge instructions.   Outcome: Ongoing     Problem: DISCHARGE BARRIERS  Goal: Patient's continuum of care needs are met  Outcome: Ongoing     Problem: Sensory:  Goal: General experience of comfort will improve  Description: General experience of comfort will improve  Outcome: Ongoing     Problem: Urinary Elimination:  Goal: Signs and symptoms of infection will decrease  Description: Signs and symptoms of infection will decrease  Outcome: Ongoing  Goal: Ability to reestablish a normal urinary elimination pattern will improve - after catheter removal  Description: Ability to reestablish a normal urinary elimination pattern will improve  Outcome: Ongoing  Goal: Complications related to the disease process, condition or treatment will be avoided or minimized  Description: Complications related to the disease process, condition or treatment will be avoided or minimized  Outcome: Ongoing     Problem: Mental Status - Impaired:  Goal: Mental status will improve  Description: Mental status will improve  Outcome: Ongoing     Problem: Pain:  Goal: Pain level will decrease  Description: Pain level will decrease  Outcome: Ongoing  Goal: Control of acute pain  Description: Control of acute pain  Outcome: Ongoing  Goal: Control of chronic pain  Description: Control of chronic pain  Outcome: Ongoing

## 2021-06-04 NOTE — PROGRESS NOTES
[facility]  Physical Therapy    Date: 2021  Patient Name: Rakel Islas        : 1955       [x] Pt Refusal Patient in bed upon arrival. She refuses to work with PTA. States first she is waiting for her daughter to come and then states she was getting ready to take a nap. Asks PTA to come back later. [] Pt Unavailable due to:           Gudelia Dallas, PTA Date: 2021

## 2021-06-04 NOTE — PROGRESS NOTES
[facility]  Physical Therapy    Date: 2021  Patient Name: Gm Tracy        : 1955       [x] Pt Refusal Patient in bed upon arrival to room. Patient states she is having pain in her stomach and refuses to work with PTA. Nurse and PTA attempt to encourage patient to sit up at edge of bed but she continues to refuse and even raises her voice a little. She remain sin bed following and will attempt to progress with PT services in the morning as able. [] Pt Unavailable due to:          Vesna Abdul, PTA Date: 2021

## 2021-06-04 NOTE — PROGRESS NOTES
[facility]  Occupational Therapy    Date: 2021  Patient Name: Malaika Collado        : 1955       [x] Pt Refusal       Patient refuses therapy this afternoon.   Will resume services in the am.      [] Pt Unavailable due to:         Gina Chandra, OTR/L Date: 2021

## 2021-06-05 VITALS
HEIGHT: 62 IN | TEMPERATURE: 97.9 F | DIASTOLIC BLOOD PRESSURE: 84 MMHG | SYSTOLIC BLOOD PRESSURE: 163 MMHG | BODY MASS INDEX: 22.16 KG/M2 | RESPIRATION RATE: 20 BRPM | WEIGHT: 120.4 LBS | HEART RATE: 71 BPM | OXYGEN SATURATION: 98 %

## 2021-06-05 LAB
CULTURE: ABNORMAL
CULTURE: ABNORMAL
Lab: ABNORMAL
SPECIMEN DESCRIPTION: ABNORMAL

## 2021-06-05 PROCEDURE — 6370000000 HC RX 637 (ALT 250 FOR IP): Performed by: INTERNAL MEDICINE

## 2021-06-05 PROCEDURE — 94761 N-INVAS EAR/PLS OXIMETRY MLT: CPT

## 2021-06-05 PROCEDURE — 6360000002 HC RX W HCPCS: Performed by: INTERNAL MEDICINE

## 2021-06-05 RX ORDER — LEVOFLOXACIN 250 MG/1
250 TABLET ORAL DAILY
Qty: 6 TABLET | Refills: 0 | DISCHARGE
Start: 2021-06-05 | End: 2021-06-08

## 2021-06-05 RX ORDER — ONDANSETRON 4 MG/1
4 TABLET, ORALLY DISINTEGRATING ORAL EVERY 8 HOURS PRN
DISCHARGE
Start: 2021-06-05

## 2021-06-05 RX ADMIN — APIXABAN 5 MG: 5 TABLET, FILM COATED ORAL at 08:20

## 2021-06-05 RX ADMIN — POLYETHYLENE GLYCOL 3350 17 G: 17 POWDER, FOR SOLUTION ORAL at 08:20

## 2021-06-05 RX ADMIN — CARVEDILOL 6.25 MG: 12.5 TABLET, FILM COATED ORAL at 08:20

## 2021-06-05 RX ADMIN — LISINOPRIL 40 MG: 20 TABLET ORAL at 08:20

## 2021-06-05 RX ADMIN — DIPHENHYDRAMINE HYDROCHLORIDE 25 MG: 50 INJECTION, SOLUTION INTRAMUSCULAR; INTRAVENOUS at 03:42

## 2021-06-05 RX ADMIN — ATORVASTATIN CALCIUM 10 MG: 20 TABLET, FILM COATED ORAL at 08:20

## 2021-06-05 RX ADMIN — DILTIAZEM HYDROCHLORIDE 120 MG: 120 CAPSULE, COATED, EXTENDED RELEASE ORAL at 08:20

## 2021-06-05 RX ADMIN — SPIRONOLACTONE 25 MG: 25 TABLET, FILM COATED ORAL at 08:20

## 2021-06-05 ASSESSMENT — PAIN SCALES - GENERAL
PAINLEVEL_OUTOF10: 0

## 2021-06-05 ASSESSMENT — PAIN SCALES - PAIN ASSESSMENT IN ADVANCED DEMENTIA (PAINAD)
NEGVOCALIZATION: 0
BREATHING: 0
CONSOLABILITY: 0
BREATHING: 0
TOTALSCORE: 0
BREATHING: 0
TOTALSCORE: 1
FACIALEXPRESSION: 0
CONSOLABILITY: 0
BODYLANGUAGE: 0
BODYLANGUAGE: 0
NEGVOCALIZATION: 1
TOTALSCORE: 0
CONSOLABILITY: 0
BODYLANGUAGE: 0
NEGVOCALIZATION: 0
FACIALEXPRESSION: 0
FACIALEXPRESSION: 0

## 2021-06-05 NOTE — PROGRESS NOTES
[facility]  Physical Therapy    Date: 2021  Patient Name: Bella Beltran        : 1955            [x] Pt Unavailable due to: patient discharging to [place], [place]  Date: 2021

## 2021-06-05 NOTE — DISCHARGE SUMMARY
and assistance    Follow-up: Follow-up with a physician at [facility]    Consultants: Cardiology Dr. Brea Guevara      Physical Exam:    Vitals:  Patient Vitals for the past 24 hrs:   BP Temp Temp src Pulse Resp SpO2   06/05/21 0815 (!) 163/84 97.9 °F (36.6 °C) Oral  20 98 %   06/05/21 0622      96 %   06/05/21 0334 116/66 98.1 °F (36.7 °C) Oral 71 16 96 %   06/05/21 0056 111/73 98 °F (36.7 °C) Oral 58 16 99 %   06/04/21 2120      97 %   06/04/21 1935 126/60 97.7 °F (36.5 °C) Oral 65 16 97 %   06/04/21 1628     18 96 %   06/04/21 0910     18 99 %     Weight: Weight: 120 lb 6.4 oz (54.6 kg)     24 hour intake/output:    Intake/Output Summary (Last 24 hours) at 6/5/2021 0851  Last data filed at 6/5/2021 7731  Gross per 24 hour   Intake 750 ml   Output    Net 750 ml        General Appearance: Awake, cooperative, confused, in no acute distress  Cardiovascular: Irregularly irregular, normal S1 and S2, no murmurs, rubs, clicks, or gallops, distal pulses intact  Pulmonary/Chest: clear to auscultation bilaterally- no wheezes  Abdomen: soft, non-tender, non-distended, normal bowel sounds  Extremities: no cyanosis, clubbing or edema   Skin: warm and dry, no rash or erythema  Neurological: normal speech, no focal findings or movement disorder noted          Hospital Course: The patient is a 77 y.o. female presented to the emergency room by EMS from home for evaluation of confusion, poor oral intake for the past 4 to 5 days. She had no complaints of having pain, she had no nausea, vomiting, no shortness of breath, no cough. Had no fever or chills. Work-up in the emergency room revealed temperature 97.5, heart rate was 138, respirations 20, blood pressure 1 62/74, she was 100% on room air. BMP showed sodium 138, potassium 3.4, CO2 21, BUN 28 and creatinine 0.94, magnesium was 1.9, calcium 9.4. LFTs unremarkable except elevated alk phos level 147. WBC was 12.9, H&H elevated 17.6/53.5, platelets 698.   CT head revealed no acute abnormalities, ventriculostomy tube. UA revealed positive nitrates, 3+ leukocyte esterase,  WBC and 4+ bacteria. Portable chest x-ray revealed no acute cardiopulmonary process. EKG showed Afib with RVR. Patient was treated in the emergency room with IV fluid boluses, also Cardizem IV bolus, started on IV Rocephin for UTI. Urine culture preliminary shows gram-negative rods more than 100,000. With improvement in mental status her IV Rocephin was changed to oral Levaquin. She was seen in consultation by cardiology Dr. Raisa Santa for A. fib with RVR. Her heart rate stabilized with boluses of IV Cardizem. She is anticoagulated with Eliquis. She is to continue on oral Cardizem and Coreg for heart rate control. 2D echo was done but official report pending  Patient's mental status improved and she is at baseline. I suspect she might have underlying dementia and will need outpatient evaluation  Patient had a generalized weakness and was evaluated by PT and OT and recommended physical therapy. Family requested ECF on discharge  Patient was suspected to have dysphagia and was evaluated by speech therapist, was not found to have any significant dysphagia  Patient is medically stable today for discharge to ECF.       Disposition: SNF    Condition: Stable    Time Spent: 34 minutes      Electronically signed by Tori Sanchez MD on 6/5/2021 at 8:51 AM  Discharging Hospitalist

## 2021-06-05 NOTE — PROGRESS NOTES
HS medications given, no signs of dysphagia present. Pt upset that she had to sit up for her medications. RN explained the importance of her medications & that she had to sit up to swallow pills to prevent aspiration. Pt allowed RN to raise [facility], however pt remained disgruntled & yelling. RN continued to provide emotional support & offered pain or anxiety medication. Pt declined Advil & Tylenol. Pt asked for Benadryl for anxiety, med given, see MAR. Currently, Pt's brief is dry & she is resting quietly.

## 2021-06-05 NOTE — PROGRESS NOTES
Report called to [address] [place] [address] at [facility].

## 2021-06-05 NOTE — PROGRESS NOTES
Pt upset & anxious after repositioning. IV Benadryl given, see MAR. Currently, Pt is resting quietly.

## 2021-06-07 PROBLEM — N39.0 UTI (URINARY TRACT INFECTION): Status: RESOLVED | Noted: 2021-05-08 | Resolved: 2021-06-07
